# Patient Record
Sex: MALE | Race: WHITE | Employment: UNEMPLOYED | URBAN - METROPOLITAN AREA
[De-identification: names, ages, dates, MRNs, and addresses within clinical notes are randomized per-mention and may not be internally consistent; named-entity substitution may affect disease eponyms.]

---

## 2019-10-15 ENCOUNTER — TELEPHONE (OUTPATIENT)
Dept: FAMILY MEDICINE CLINIC | Age: 54
End: 2019-10-15

## 2019-10-21 ENCOUNTER — HOSPITAL ENCOUNTER (EMERGENCY)
Age: 54
Discharge: HOME | End: 2019-10-21
Attending: EMERGENCY MEDICINE
Payer: COMMERCIAL

## 2019-10-21 ENCOUNTER — APPOINTMENT (OUTPATIENT)
Dept: CT IMAGING | Age: 54
End: 2019-10-21
Payer: COMMERCIAL

## 2019-10-21 VITALS
SYSTOLIC BLOOD PRESSURE: 160 MMHG | TEMPERATURE: 97.6 F | RESPIRATION RATE: 14 BRPM | OXYGEN SATURATION: 99 % | HEART RATE: 49 BPM | DIASTOLIC BLOOD PRESSURE: 93 MMHG

## 2019-10-21 DIAGNOSIS — K29.00 OTHER ACUTE GASTRITIS WITHOUT HEMORRHAGE: ICD-10-CM

## 2019-10-21 DIAGNOSIS — R10.13 EPIGASTRIC PAIN: Primary | ICD-10-CM

## 2019-10-21 LAB
ALBUMIN SERPL-MCNC: 4.5 G/DL (ref 3.4–5)
ALP BLD-CCNC: 116 U/L (ref 40–129)
ALT SERPL-CCNC: 42 U/L (ref 10–40)
ANION GAP SERPL CALCULATED.3IONS-SCNC: 14 MMOL/L (ref 3–16)
AST SERPL-CCNC: 32 U/L (ref 15–37)
BASOPHILS ABSOLUTE: 0.1 K/UL (ref 0–0.2)
BASOPHILS RELATIVE PERCENT: 1 %
BILIRUB SERPL-MCNC: 0.6 MG/DL (ref 0–1)
BILIRUBIN DIRECT: <0.2 MG/DL (ref 0–0.3)
BILIRUBIN, INDIRECT: ABNORMAL MG/DL (ref 0–1)
BUN BLDV-MCNC: 15 MG/DL (ref 7–20)
CALCIUM SERPL-MCNC: 9.9 MG/DL (ref 8.3–10.6)
CHLORIDE BLD-SCNC: 100 MMOL/L (ref 99–110)
CO2: 22 MMOL/L (ref 21–32)
CREAT SERPL-MCNC: 1 MG/DL (ref 0.9–1.3)
EKG ATRIAL RATE: 84 BPM
EKG DIAGNOSIS: NORMAL
EKG P-R INTERVAL: 158 MS
EKG Q-T INTERVAL: 344 MS
EKG QRS DURATION: 88 MS
EKG QTC CALCULATION (BAZETT): 406 MS
EKG R AXIS: 120 DEGREES
EKG T AXIS: 94 DEGREES
EKG VENTRICULAR RATE: 84 BPM
EOSINOPHILS ABSOLUTE: 1 K/UL (ref 0–0.6)
EOSINOPHILS RELATIVE PERCENT: 8.1 %
GFR AFRICAN AMERICAN: >60
GFR NON-AFRICAN AMERICAN: >60
GLUCOSE BLD-MCNC: 119 MG/DL (ref 70–99)
HCT VFR BLD CALC: 48.7 % (ref 40.5–52.5)
HEMOGLOBIN: 16.5 G/DL (ref 13.5–17.5)
LIPASE: 43 U/L (ref 13–60)
LYMPHOCYTES ABSOLUTE: 2 K/UL (ref 1–5.1)
LYMPHOCYTES RELATIVE PERCENT: 17.1 %
MCH RBC QN AUTO: 29.3 PG (ref 26–34)
MCHC RBC AUTO-ENTMCNC: 33.9 G/DL (ref 31–36)
MCV RBC AUTO: 86.3 FL (ref 80–100)
MONOCYTES ABSOLUTE: 1 K/UL (ref 0–1.3)
MONOCYTES RELATIVE PERCENT: 8.1 %
NEUTROPHILS ABSOLUTE: 7.8 K/UL (ref 1.7–7.7)
NEUTROPHILS RELATIVE PERCENT: 65.7 %
PDW BLD-RTO: 14.3 % (ref 12.4–15.4)
PLATELET # BLD: 284 K/UL (ref 135–450)
PMV BLD AUTO: 7.9 FL (ref 5–10.5)
POTASSIUM SERPL-SCNC: 3.8 MMOL/L (ref 3.5–5.1)
RBC # BLD: 5.64 M/UL (ref 4.2–5.9)
SODIUM BLD-SCNC: 136 MMOL/L (ref 136–145)
TOTAL PROTEIN: 7.9 G/DL (ref 6.4–8.2)
TROPONIN: <0.01 NG/ML
WBC # BLD: 11.8 K/UL (ref 4–11)

## 2019-10-21 PROCEDURE — 96374 THER/PROPH/DIAG INJ IV PUSH: CPT

## 2019-10-21 PROCEDURE — 80076 HEPATIC FUNCTION PANEL: CPT

## 2019-10-21 PROCEDURE — 96372 THER/PROPH/DIAG INJ SC/IM: CPT

## 2019-10-21 PROCEDURE — 6360000002 HC RX W HCPCS: Performed by: EMERGENCY MEDICINE

## 2019-10-21 PROCEDURE — 74177 CT ABD & PELVIS W/CONTRAST: CPT

## 2019-10-21 PROCEDURE — 99284 EMERGENCY DEPT VISIT MOD MDM: CPT

## 2019-10-21 PROCEDURE — 36415 COLL VENOUS BLD VENIPUNCTURE: CPT

## 2019-10-21 PROCEDURE — 85025 COMPLETE CBC W/AUTO DIFF WBC: CPT

## 2019-10-21 PROCEDURE — 84484 ASSAY OF TROPONIN QUANT: CPT

## 2019-10-21 PROCEDURE — 83690 ASSAY OF LIPASE: CPT

## 2019-10-21 PROCEDURE — 93005 ELECTROCARDIOGRAM TRACING: CPT | Performed by: EMERGENCY MEDICINE

## 2019-10-21 PROCEDURE — C9113 INJ PANTOPRAZOLE SODIUM, VIA: HCPCS | Performed by: EMERGENCY MEDICINE

## 2019-10-21 PROCEDURE — 80048 BASIC METABOLIC PNL TOTAL CA: CPT

## 2019-10-21 PROCEDURE — 6370000000 HC RX 637 (ALT 250 FOR IP): Performed by: EMERGENCY MEDICINE

## 2019-10-21 PROCEDURE — 6360000004 HC RX CONTRAST MEDICATION: Performed by: EMERGENCY MEDICINE

## 2019-10-21 PROCEDURE — 96375 TX/PRO/DX INJ NEW DRUG ADDON: CPT

## 2019-10-21 RX ORDER — DICYCLOMINE HYDROCHLORIDE 10 MG/1
10 CAPSULE ORAL 3 TIMES DAILY PRN
Qty: 120 CAPSULE | Refills: 3 | Status: SHIPPED | OUTPATIENT
Start: 2019-10-21 | End: 2021-12-27

## 2019-10-21 RX ORDER — ONDANSETRON 2 MG/ML
4 INJECTION INTRAMUSCULAR; INTRAVENOUS ONCE
Status: COMPLETED | OUTPATIENT
Start: 2019-10-21 | End: 2019-10-21

## 2019-10-21 RX ORDER — SUCRALFATE ORAL 1 G/10ML
1 SUSPENSION ORAL ONCE
Status: COMPLETED | OUTPATIENT
Start: 2019-10-21 | End: 2019-10-21

## 2019-10-21 RX ORDER — ASPIRIN 325 MG
325 TABLET ORAL DAILY
Status: ON HOLD | COMMUNITY
End: 2019-10-29 | Stop reason: HOSPADM

## 2019-10-21 RX ORDER — OMEPRAZOLE 10 MG/1
10 CAPSULE, DELAYED RELEASE ORAL DAILY
COMMUNITY
End: 2019-10-21

## 2019-10-21 RX ORDER — METOCLOPRAMIDE HYDROCHLORIDE 5 MG/ML
10 INJECTION INTRAMUSCULAR; INTRAVENOUS ONCE
Status: COMPLETED | OUTPATIENT
Start: 2019-10-21 | End: 2019-10-21

## 2019-10-21 RX ORDER — PANTOPRAZOLE SODIUM 40 MG/10ML
40 INJECTION, POWDER, LYOPHILIZED, FOR SOLUTION INTRAVENOUS ONCE
Status: COMPLETED | OUTPATIENT
Start: 2019-10-21 | End: 2019-10-21

## 2019-10-21 RX ORDER — DICYCLOMINE HYDROCHLORIDE 10 MG/ML
20 INJECTION INTRAMUSCULAR ONCE
Status: COMPLETED | OUTPATIENT
Start: 2019-10-21 | End: 2019-10-21

## 2019-10-21 RX ORDER — ATORVASTATIN CALCIUM 10 MG/1
10 TABLET, FILM COATED ORAL DAILY
COMMUNITY
End: 2019-10-24

## 2019-10-21 RX ORDER — OMEPRAZOLE 20 MG/1
20 CAPSULE, DELAYED RELEASE ORAL
Qty: 60 CAPSULE | Refills: 0 | Status: SHIPPED | OUTPATIENT
Start: 2019-10-21 | End: 2019-11-26 | Stop reason: SDUPTHER

## 2019-10-21 RX ADMIN — SUCRALFATE 1 G: 1 SUSPENSION ORAL at 04:39

## 2019-10-21 RX ADMIN — HYDROMORPHONE HYDROCHLORIDE 1 MG: 1 INJECTION, SOLUTION INTRAMUSCULAR; INTRAVENOUS; SUBCUTANEOUS at 02:32

## 2019-10-21 RX ADMIN — IOPAMIDOL 80 ML: 755 INJECTION, SOLUTION INTRAVENOUS at 03:45

## 2019-10-21 RX ADMIN — LIDOCAINE HYDROCHLORIDE: 20 SOLUTION ORAL; TOPICAL at 03:02

## 2019-10-21 RX ADMIN — ONDANSETRON 4 MG: 2 INJECTION INTRAMUSCULAR; INTRAVENOUS at 02:32

## 2019-10-21 RX ADMIN — METOCLOPRAMIDE 10 MG: 5 INJECTION, SOLUTION INTRAMUSCULAR; INTRAVENOUS at 02:32

## 2019-10-21 RX ADMIN — DICYCLOMINE HYDROCHLORIDE 20 MG: 20 INJECTION, SOLUTION INTRAMUSCULAR at 05:06

## 2019-10-21 RX ADMIN — PANTOPRAZOLE SODIUM 40 MG: 40 INJECTION, POWDER, FOR SOLUTION INTRAVENOUS at 02:32

## 2019-10-21 ASSESSMENT — PAIN DESCRIPTION - LOCATION
LOCATION: ABDOMEN;CHEST
LOCATION: ABDOMEN;BACK

## 2019-10-21 ASSESSMENT — PAIN DESCRIPTION - FREQUENCY: FREQUENCY: INTERMITTENT

## 2019-10-21 ASSESSMENT — PAIN DESCRIPTION - PROGRESSION: CLINICAL_PROGRESSION: NOT CHANGED

## 2019-10-21 ASSESSMENT — PAIN SCALES - GENERAL
PAINLEVEL_OUTOF10: 10
PAINLEVEL_OUTOF10: 9

## 2019-10-21 ASSESSMENT — PAIN DESCRIPTION - DESCRIPTORS: DESCRIPTORS: SHARP;ACHING

## 2019-10-21 ASSESSMENT — PAIN DESCRIPTION - PAIN TYPE
TYPE: ACUTE PAIN
TYPE: ACUTE PAIN

## 2019-10-24 ENCOUNTER — OFFICE VISIT (OUTPATIENT)
Dept: INTERNAL MEDICINE CLINIC | Age: 54
End: 2019-10-24
Payer: COMMERCIAL

## 2019-10-24 VITALS
OXYGEN SATURATION: 98 % | SYSTOLIC BLOOD PRESSURE: 123 MMHG | WEIGHT: 198.9 LBS | TEMPERATURE: 97.3 F | RESPIRATION RATE: 16 BRPM | HEART RATE: 92 BPM | DIASTOLIC BLOOD PRESSURE: 85 MMHG

## 2019-10-24 DIAGNOSIS — E78.2 MIXED HYPERLIPIDEMIA: ICD-10-CM

## 2019-10-24 DIAGNOSIS — R10.11 RUQ ABDOMINAL PAIN: ICD-10-CM

## 2019-10-24 DIAGNOSIS — Z72.0 TOBACCO ABUSE: ICD-10-CM

## 2019-10-24 DIAGNOSIS — R73.9 HYPERGLYCEMIA: Primary | ICD-10-CM

## 2019-10-24 DIAGNOSIS — K21.9 GASTROESOPHAGEAL REFLUX DISEASE, ESOPHAGITIS PRESENCE NOT SPECIFIED: ICD-10-CM

## 2019-10-24 PROCEDURE — 99213 OFFICE O/P EST LOW 20 MIN: CPT

## 2019-10-24 RX ORDER — BUPROPION HYDROCHLORIDE 150 MG/1
150 TABLET, EXTENDED RELEASE ORAL 2 TIMES DAILY
Qty: 60 TABLET | Refills: 3 | Status: SHIPPED | OUTPATIENT
Start: 2019-10-24 | End: 2019-11-26 | Stop reason: SDUPTHER

## 2019-10-24 RX ORDER — ATORVASTATIN CALCIUM 80 MG/1
80 TABLET, FILM COATED ORAL DAILY
Qty: 30 TABLET | Refills: 5 | Status: SHIPPED | OUTPATIENT
Start: 2019-10-24 | End: 2019-11-26 | Stop reason: SDUPTHER

## 2019-10-24 ASSESSMENT — ENCOUNTER SYMPTOMS
VOMITING: 0
ABDOMINAL DISTENTION: 0
EYES NEGATIVE: 1
DIARRHEA: 1
RECTAL PAIN: 0
BLOOD IN STOOL: 0
ANAL BLEEDING: 0
ABDOMINAL PAIN: 1
NAUSEA: 0
CONSTIPATION: 0
ALLERGIC/IMMUNOLOGIC NEGATIVE: 1
RESPIRATORY NEGATIVE: 1

## 2019-10-24 ASSESSMENT — PATIENT HEALTH QUESTIONNAIRE - PHQ9
1. LITTLE INTEREST OR PLEASURE IN DOING THINGS: 0
SUM OF ALL RESPONSES TO PHQ QUESTIONS 1-9: 0
SUM OF ALL RESPONSES TO PHQ9 QUESTIONS 1 & 2: 0
SUM OF ALL RESPONSES TO PHQ QUESTIONS 1-9: 0
2. FEELING DOWN, DEPRESSED OR HOPELESS: 0

## 2019-10-25 DIAGNOSIS — K21.9 GASTROESOPHAGEAL REFLUX DISEASE, ESOPHAGITIS PRESENCE NOT SPECIFIED: ICD-10-CM

## 2019-10-25 DIAGNOSIS — R73.9 HYPERGLYCEMIA: ICD-10-CM

## 2019-10-25 LAB — TSH REFLEX: 1.46 UIU/ML (ref 0.27–4.2)

## 2019-10-26 LAB
ESTIMATED AVERAGE GLUCOSE: 105.4 MG/DL
HBA1C MFR BLD: 5.3 %

## 2019-10-28 ENCOUNTER — HOSPITAL ENCOUNTER (OUTPATIENT)
Dept: ULTRASOUND IMAGING | Age: 54
Discharge: HOME | End: 2019-10-28
Payer: COMMERCIAL

## 2019-10-28 DIAGNOSIS — R10.11 RUQ ABDOMINAL PAIN: ICD-10-CM

## 2019-10-28 PROCEDURE — 76705 ECHO EXAM OF ABDOMEN: CPT

## 2019-10-29 ENCOUNTER — HOSPITAL ENCOUNTER (OUTPATIENT)
Age: 54
Setting detail: OUTPATIENT SURGERY
Discharge: HOME | End: 2019-10-29
Attending: INTERNAL MEDICINE | Admitting: INTERNAL MEDICINE
Payer: COMMERCIAL

## 2019-10-29 VITALS
SYSTOLIC BLOOD PRESSURE: 98 MMHG | HEART RATE: 81 BPM | HEIGHT: 70 IN | DIASTOLIC BLOOD PRESSURE: 72 MMHG | WEIGHT: 196 LBS | OXYGEN SATURATION: 94 % | RESPIRATION RATE: 16 BRPM | TEMPERATURE: 97.5 F | BODY MASS INDEX: 28.06 KG/M2

## 2019-10-29 LAB
ALBUMIN SERPL-MCNC: 4.1 G/DL (ref 3.4–5)
ALP BLD-CCNC: 99 U/L (ref 40–129)
ALT SERPL-CCNC: 33 U/L (ref 10–40)
AST SERPL-CCNC: 35 U/L (ref 15–37)
BILIRUB SERPL-MCNC: 0.9 MG/DL (ref 0–1)
BILIRUBIN DIRECT: <0.2 MG/DL (ref 0–0.3)
BILIRUBIN, INDIRECT: NORMAL MG/DL (ref 0–1)
TOTAL PROTEIN: 7.2 G/DL (ref 6.4–8.2)

## 2019-10-29 PROCEDURE — 6370000000 HC RX 637 (ALT 250 FOR IP): Performed by: INTERNAL MEDICINE

## 2019-10-29 PROCEDURE — 36415 COLL VENOUS BLD VENIPUNCTURE: CPT

## 2019-10-29 PROCEDURE — 7100000010 HC PHASE II RECOVERY - FIRST 15 MIN: Performed by: INTERNAL MEDICINE

## 2019-10-29 PROCEDURE — 3609017100 HC EGD: Performed by: INTERNAL MEDICINE

## 2019-10-29 PROCEDURE — 80076 HEPATIC FUNCTION PANEL: CPT

## 2019-10-29 PROCEDURE — 7100000011 HC PHASE II RECOVERY - ADDTL 15 MIN: Performed by: INTERNAL MEDICINE

## 2019-10-29 PROCEDURE — 99152 MOD SED SAME PHYS/QHP 5/>YRS: CPT | Performed by: INTERNAL MEDICINE

## 2019-10-29 PROCEDURE — 6360000002 HC RX W HCPCS: Performed by: INTERNAL MEDICINE

## 2019-10-29 RX ORDER — MEPERIDINE HYDROCHLORIDE 50 MG/ML
INJECTION INTRAMUSCULAR; INTRAVENOUS; SUBCUTANEOUS PRN
Status: DISCONTINUED | OUTPATIENT
Start: 2019-10-29 | End: 2019-10-29 | Stop reason: ALTCHOICE

## 2019-10-29 RX ORDER — MIDAZOLAM HYDROCHLORIDE 1 MG/ML
INJECTION INTRAMUSCULAR; INTRAVENOUS PRN
Status: DISCONTINUED | OUTPATIENT
Start: 2019-10-29 | End: 2019-10-29 | Stop reason: ALTCHOICE

## 2019-10-29 ASSESSMENT — PAIN - FUNCTIONAL ASSESSMENT
PAIN_FUNCTIONAL_ASSESSMENT: PREVENTS OR INTERFERES SOME ACTIVE ACTIVITIES AND ADLS
PAIN_FUNCTIONAL_ASSESSMENT: 0-10

## 2019-10-29 ASSESSMENT — PAIN SCALES - GENERAL
PAINLEVEL_OUTOF10: 0
PAINLEVEL_OUTOF10: 0

## 2019-10-29 ASSESSMENT — PAIN DESCRIPTION - DESCRIPTORS: DESCRIPTORS: CRAMPING

## 2019-10-31 ENCOUNTER — HOSPITAL ENCOUNTER (OUTPATIENT)
Age: 54
Setting detail: OUTPATIENT SURGERY
Discharge: HOME | End: 2019-10-31
Attending: INTERNAL MEDICINE | Admitting: INTERNAL MEDICINE
Payer: COMMERCIAL

## 2019-10-31 VITALS
BODY MASS INDEX: 28.06 KG/M2 | SYSTOLIC BLOOD PRESSURE: 101 MMHG | HEART RATE: 71 BPM | RESPIRATION RATE: 15 BRPM | HEIGHT: 70 IN | WEIGHT: 196 LBS | DIASTOLIC BLOOD PRESSURE: 72 MMHG | TEMPERATURE: 97.6 F | OXYGEN SATURATION: 93 %

## 2019-10-31 PROCEDURE — 3609027000 HC COLONOSCOPY: Performed by: INTERNAL MEDICINE

## 2019-10-31 PROCEDURE — 7100000010 HC PHASE II RECOVERY - FIRST 15 MIN: Performed by: INTERNAL MEDICINE

## 2019-10-31 PROCEDURE — 2500000003 HC RX 250 WO HCPCS: Performed by: INTERNAL MEDICINE

## 2019-10-31 PROCEDURE — 6360000002 HC RX W HCPCS: Performed by: INTERNAL MEDICINE

## 2019-10-31 PROCEDURE — 99152 MOD SED SAME PHYS/QHP 5/>YRS: CPT | Performed by: INTERNAL MEDICINE

## 2019-10-31 PROCEDURE — 7100000011 HC PHASE II RECOVERY - ADDTL 15 MIN: Performed by: INTERNAL MEDICINE

## 2019-10-31 RX ORDER — MEPERIDINE HYDROCHLORIDE 50 MG/ML
INJECTION INTRAMUSCULAR; INTRAVENOUS; SUBCUTANEOUS PRN
Status: DISCONTINUED | OUTPATIENT
Start: 2019-10-31 | End: 2019-10-31 | Stop reason: ALTCHOICE

## 2019-10-31 RX ORDER — MIDAZOLAM HYDROCHLORIDE 1 MG/ML
INJECTION INTRAMUSCULAR; INTRAVENOUS PRN
Status: DISCONTINUED | OUTPATIENT
Start: 2019-10-31 | End: 2019-10-31 | Stop reason: ALTCHOICE

## 2019-10-31 RX ORDER — ASPIRIN 325 MG
325 TABLET ORAL DAILY
COMMUNITY
End: 2021-12-27

## 2019-10-31 ASSESSMENT — PAIN SCALES - WONG BAKER
WONGBAKER_NUMERICALRESPONSE: 0
WONGBAKER_NUMERICALRESPONSE: 2
WONGBAKER_NUMERICALRESPONSE: 0

## 2019-10-31 ASSESSMENT — PAIN - FUNCTIONAL ASSESSMENT: PAIN_FUNCTIONAL_ASSESSMENT: 0-10

## 2019-11-26 ENCOUNTER — OFFICE VISIT (OUTPATIENT)
Dept: INTERNAL MEDICINE CLINIC | Age: 54
End: 2019-11-26
Payer: COMMERCIAL

## 2019-11-26 VITALS
WEIGHT: 193.5 LBS | BODY MASS INDEX: 27.7 KG/M2 | TEMPERATURE: 97.9 F | RESPIRATION RATE: 16 BRPM | SYSTOLIC BLOOD PRESSURE: 117 MMHG | OXYGEN SATURATION: 96 % | DIASTOLIC BLOOD PRESSURE: 87 MMHG | HEART RATE: 89 BPM | HEIGHT: 70 IN

## 2019-11-26 DIAGNOSIS — M25.551 PAIN OF RIGHT HIP JOINT: ICD-10-CM

## 2019-11-26 DIAGNOSIS — Z86.73 HX OF TRANSIENT ISCHEMIC ATTACK (TIA): ICD-10-CM

## 2019-11-26 DIAGNOSIS — Z72.0 TOBACCO ABUSE: ICD-10-CM

## 2019-11-26 DIAGNOSIS — K21.9 GASTROESOPHAGEAL REFLUX DISEASE, ESOPHAGITIS PRESENCE NOT SPECIFIED: Primary | ICD-10-CM

## 2019-11-26 DIAGNOSIS — E78.2 MIXED HYPERLIPIDEMIA: ICD-10-CM

## 2019-11-26 PROCEDURE — 99213 OFFICE O/P EST LOW 20 MIN: CPT | Performed by: STUDENT IN AN ORGANIZED HEALTH CARE EDUCATION/TRAINING PROGRAM

## 2019-11-26 RX ORDER — OMEPRAZOLE 20 MG/1
20 CAPSULE, DELAYED RELEASE ORAL
Qty: 60 CAPSULE | Refills: 0 | Status: SHIPPED | OUTPATIENT
Start: 2019-11-26 | End: 2020-01-07 | Stop reason: SDUPTHER

## 2019-11-26 RX ORDER — ATORVASTATIN CALCIUM 80 MG/1
80 TABLET, FILM COATED ORAL DAILY
Qty: 30 TABLET | Refills: 5 | Status: SHIPPED | OUTPATIENT
Start: 2019-11-26 | End: 2020-06-29 | Stop reason: SDUPTHER

## 2019-11-26 RX ORDER — BUPROPION HYDROCHLORIDE 150 MG/1
150 TABLET, EXTENDED RELEASE ORAL 2 TIMES DAILY
Qty: 60 TABLET | Refills: 3 | Status: SHIPPED | OUTPATIENT
Start: 2019-11-26 | End: 2020-04-24

## 2019-11-26 ASSESSMENT — ENCOUNTER SYMPTOMS
CONSTIPATION: 0
SORE THROAT: 0
COUGH: 0
DIARRHEA: 0
ABDOMINAL PAIN: 0
SHORTNESS OF BREATH: 0
NAUSEA: 0
BACK PAIN: 1
WHEEZING: 0
VOMITING: 0
RHINORRHEA: 0
ABDOMINAL DISTENTION: 0

## 2019-12-05 ENCOUNTER — HOSPITAL ENCOUNTER (OUTPATIENT)
Dept: GENERAL RADIOLOGY | Age: 54
Discharge: HOME | End: 2019-12-05

## 2019-12-05 ENCOUNTER — HOSPITAL ENCOUNTER (OUTPATIENT)
Age: 54
Discharge: HOME | End: 2019-12-05

## 2019-12-05 DIAGNOSIS — M25.551 PAIN OF RIGHT HIP JOINT: ICD-10-CM

## 2019-12-05 LAB
BASOPHILS ABSOLUTE: 0.1 K/UL (ref 0–0.2)
BASOPHILS RELATIVE PERCENT: 1.3 %
EOSINOPHILS ABSOLUTE: 0.3 K/UL (ref 0–0.6)
EOSINOPHILS RELATIVE PERCENT: 4.8 %
HCT VFR BLD CALC: 43.6 % (ref 40.5–52.5)
HEMOGLOBIN: 14.7 G/DL (ref 13.5–17.5)
LYMPHOCYTES ABSOLUTE: 1.4 K/UL (ref 1–5.1)
LYMPHOCYTES RELATIVE PERCENT: 20.1 %
MCH RBC QN AUTO: 29.4 PG (ref 26–34)
MCHC RBC AUTO-ENTMCNC: 33.8 G/DL (ref 31–36)
MCV RBC AUTO: 87.1 FL (ref 80–100)
MONOCYTES ABSOLUTE: 0.6 K/UL (ref 0–1.3)
MONOCYTES RELATIVE PERCENT: 9.1 %
NEUTROPHILS ABSOLUTE: 4.4 K/UL (ref 1.7–7.7)
NEUTROPHILS RELATIVE PERCENT: 64.7 %
PDW BLD-RTO: 14.9 % (ref 12.4–15.4)
PLATELET # BLD: 249 K/UL (ref 135–450)
PMV BLD AUTO: 8.6 FL (ref 5–10.5)
RBC # BLD: 5 M/UL (ref 4.2–5.9)
WBC # BLD: 6.8 K/UL (ref 4–11)

## 2019-12-05 PROCEDURE — 73502 X-RAY EXAM HIP UNI 2-3 VIEWS: CPT

## 2020-01-07 RX ORDER — OMEPRAZOLE 20 MG/1
20 CAPSULE, DELAYED RELEASE ORAL
Qty: 60 CAPSULE | Refills: 0 | Status: SHIPPED | OUTPATIENT
Start: 2020-01-07 | End: 2020-03-25

## 2020-01-07 RX ORDER — OMEPRAZOLE 20 MG/1
CAPSULE, DELAYED RELEASE ORAL
Qty: 60 CAPSULE | Refills: 0 | Status: SHIPPED | OUTPATIENT
Start: 2020-01-07 | End: 2020-03-23

## 2020-03-08 ENCOUNTER — APPOINTMENT (OUTPATIENT)
Dept: CT IMAGING | Age: 55
End: 2020-03-08

## 2020-03-08 ENCOUNTER — HOSPITAL ENCOUNTER (EMERGENCY)
Age: 55
Discharge: HOME | End: 2020-03-08
Attending: EMERGENCY MEDICINE

## 2020-03-08 VITALS
HEART RATE: 70 BPM | BODY MASS INDEX: 27.05 KG/M2 | TEMPERATURE: 97.4 F | RESPIRATION RATE: 15 BRPM | WEIGHT: 188.5 LBS | OXYGEN SATURATION: 99 % | DIASTOLIC BLOOD PRESSURE: 93 MMHG | SYSTOLIC BLOOD PRESSURE: 140 MMHG

## 2020-03-08 LAB
A/G RATIO: 1.4 (ref 1.1–2.2)
ALBUMIN SERPL-MCNC: 4.2 G/DL (ref 3.4–5)
ALP BLD-CCNC: 120 U/L (ref 40–129)
ALT SERPL-CCNC: 21 U/L (ref 10–40)
ANION GAP SERPL CALCULATED.3IONS-SCNC: 13 MMOL/L (ref 3–16)
AST SERPL-CCNC: 21 U/L (ref 15–37)
BILIRUB SERPL-MCNC: 0.4 MG/DL (ref 0–1)
BUN BLDV-MCNC: 17 MG/DL (ref 7–20)
CALCIUM SERPL-MCNC: 9.3 MG/DL (ref 8.3–10.6)
CHLORIDE BLD-SCNC: 104 MMOL/L (ref 99–110)
CO2: 25 MMOL/L (ref 21–32)
CREAT SERPL-MCNC: 0.9 MG/DL (ref 0.9–1.3)
GFR AFRICAN AMERICAN: >60
GFR NON-AFRICAN AMERICAN: >60
GLOBULIN: 2.9 G/DL
GLUCOSE BLD-MCNC: 106 MG/DL (ref 70–99)
HCT VFR BLD CALC: 49.1 % (ref 40.5–52.5)
HEMOGLOBIN: 16.6 G/DL (ref 13.5–17.5)
LIPASE: 35 U/L (ref 13–60)
MCH RBC QN AUTO: 29.5 PG (ref 26–34)
MCHC RBC AUTO-ENTMCNC: 33.8 G/DL (ref 31–36)
MCV RBC AUTO: 87.1 FL (ref 80–100)
PDW BLD-RTO: 14.5 % (ref 12.4–15.4)
PLATELET # BLD: 263 K/UL (ref 135–450)
PMV BLD AUTO: 7.8 FL (ref 5–10.5)
POTASSIUM REFLEX MAGNESIUM: 4.8 MMOL/L (ref 3.5–5.1)
RBC # BLD: 5.63 M/UL (ref 4.2–5.9)
SODIUM BLD-SCNC: 142 MMOL/L (ref 136–145)
TOTAL PROTEIN: 7.1 G/DL (ref 6.4–8.2)
WBC # BLD: 8.8 K/UL (ref 4–11)

## 2020-03-08 PROCEDURE — 6370000000 HC RX 637 (ALT 250 FOR IP): Performed by: EMERGENCY MEDICINE

## 2020-03-08 PROCEDURE — 6360000002 HC RX W HCPCS: Performed by: EMERGENCY MEDICINE

## 2020-03-08 PROCEDURE — 83690 ASSAY OF LIPASE: CPT

## 2020-03-08 PROCEDURE — 6360000004 HC RX CONTRAST MEDICATION: Performed by: EMERGENCY MEDICINE

## 2020-03-08 PROCEDURE — 74177 CT ABD & PELVIS W/CONTRAST: CPT

## 2020-03-08 PROCEDURE — 85027 COMPLETE CBC AUTOMATED: CPT

## 2020-03-08 PROCEDURE — 99284 EMERGENCY DEPT VISIT MOD MDM: CPT

## 2020-03-08 PROCEDURE — 80053 COMPREHEN METABOLIC PANEL: CPT

## 2020-03-08 PROCEDURE — 2500000003 HC RX 250 WO HCPCS: Performed by: EMERGENCY MEDICINE

## 2020-03-08 RX ORDER — DICYCLOMINE HYDROCHLORIDE 10 MG/1
10 CAPSULE ORAL
Status: DISCONTINUED | OUTPATIENT
Start: 2020-03-08 | End: 2020-03-08 | Stop reason: HOSPADM

## 2020-03-08 RX ORDER — ONDANSETRON 2 MG/ML
4 INJECTION INTRAMUSCULAR; INTRAVENOUS ONCE
Status: COMPLETED | OUTPATIENT
Start: 2020-03-08 | End: 2020-03-08

## 2020-03-08 RX ADMIN — LIDOCAINE HYDROCHLORIDE: 20 SOLUTION ORAL; TOPICAL at 14:38

## 2020-03-08 RX ADMIN — HYDROMORPHONE HYDROCHLORIDE 1 MG: 1 INJECTION, SOLUTION INTRAMUSCULAR; INTRAVENOUS; SUBCUTANEOUS at 16:13

## 2020-03-08 RX ADMIN — IOHEXOL 50 ML: 240 INJECTION, SOLUTION INTRATHECAL; INTRAVASCULAR; INTRAVENOUS; ORAL at 17:28

## 2020-03-08 RX ADMIN — DICYCLOMINE HYDROCHLORIDE 10 MG: 10 CAPSULE ORAL at 16:10

## 2020-03-08 RX ADMIN — ONDANSETRON 4 MG: 2 INJECTION INTRAMUSCULAR; INTRAVENOUS at 16:11

## 2020-03-08 RX ADMIN — FAMOTIDINE 20 MG: 10 INJECTION, SOLUTION INTRAVENOUS at 16:01

## 2020-03-08 RX ADMIN — IOPAMIDOL 80 ML: 755 INJECTION, SOLUTION INTRAVENOUS at 17:31

## 2020-03-08 ASSESSMENT — PAIN SCALES - GENERAL: PAINLEVEL_OUTOF10: 6

## 2020-03-08 ASSESSMENT — PAIN DESCRIPTION - ORIENTATION: ORIENTATION: UPPER

## 2020-03-08 ASSESSMENT — PAIN DESCRIPTION - LOCATION: LOCATION: ABDOMEN

## 2020-03-08 ASSESSMENT — PAIN DESCRIPTION - PAIN TYPE: TYPE: ACUTE PAIN

## 2020-03-08 ASSESSMENT — PAIN DESCRIPTION - DESCRIPTORS: DESCRIPTORS: ACHING

## 2020-03-08 NOTE — ED PROVIDER NOTES
Never    Sexual activity: Not on file   Lifestyle    Physical activity     Days per week: Not on file     Minutes per session: Not on file    Stress: Not on file   Relationships    Social connections     Talks on phone: Not on file     Gets together: Not on file     Attends Zoroastrian service: Not on file     Active member of club or organization: Not on file     Attends meetings of clubs or organizations: Not on file     Relationship status: Not on file    Intimate partner violence     Fear of current or ex partner: Not on file     Emotionally abused: Not on file     Physically abused: Not on file     Forced sexual activity: Not on file   Other Topics Concern    Not on file   Social History Narrative    Not on file     No current facility-administered medications for this encounter. Current Outpatient Medications   Medication Sig Dispense Refill    oxyCODONE HCl 5 MG TABA Take 5 mg by mouth 4 times daily for 3 days. 6 each 0    omeprazole (PRILOSEC) 20 MG delayed release capsule TAKE 1 CAPSULE BY MOUTH 2 TIMES A DAY BEFORE MEALS 60 capsule 0    omeprazole (PRILOSEC) 20 MG delayed release capsule Take 1 capsule by mouth 2 times daily (before meals) 60 capsule 0    atorvastatin (LIPITOR) 80 MG tablet Take 1 tablet by mouth daily 30 tablet 5    buPROPion (ZYBAN) 150 MG extended release tablet Take 1 tablet by mouth 2 times daily 60 tablet 3    diclofenac sodium 1 % GEL Apply 4 g topically 4 times daily 4 Tube 1    aspirin 325 MG tablet Take 325 mg by mouth daily      dicyclomine (BENTYL) 10 MG capsule Take 1 capsule by mouth 3 times daily as needed (abdominal pain) 120 capsule 3     Allergies   Allergen Reactions    Pcn [Penicillins] Hives     childhood          PHYSICAL EXAM  BP (!) 140/93   Pulse 70   Temp 97.4 °F (36.3 °C) (Oral)   Resp 15   Wt 85.5 kg (188 lb 8 oz)   SpO2 99%   BMI 27.05 kg/m²   Physical Exam   GENERAL APPEARANCE: Awake and alert. Cooperative. In no acute distress.   EYES: PERRL. Corneas clear. Sclera non icteric. No conjunctival injection  ENT: Oropharynx clear. Mucous membranes are moist airway patent. No stridor. No asymmetry. NECK: Supple  LUNGS: Clear. Equal breath sounds bilaterally. CARDIOVASCULAR: RRR. No murmurs rubs or gallops. ABDOMEN: Soft moderate epigastric tenderness no guarding or rebound. No mass or bruit  EXTREMITIES:  Moves all extremities equally. Full pulses are equal  SKIN: Warm and dry. NEURO: Alert and oriented x3. Strength 5/5 throughout. LABORATORY STUDIES:   Labs Reviewed   COMPREHENSIVE METABOLIC PANEL W/ REFLEX TO MG FOR LOW K - Abnormal; Notable for the following components:       Result Value    Glucose 106 (*)     All other components within normal limits    Narrative:     Performed at:                  [facility]                  [address],  [facility]   Phone [phone]   CBC    Narrative:     Performed at:                  [facility]                  [address],  [facility]   Phone [phone]   LIPASE    Narrative:     Performed at:                  [facility]                  [address],  [facility]   Phone [phone]        RADIOLOGY  CT ABDOMEN PELVIS W IV CONTRAST Additional Contrast? Oral   Final Result      1. No acute abnormality in the abdomen and pelvis. If EKG done, EKG was interpreted independently by me    PROCEDURES  Procedures    EDCOURSE/MDM  Patient seen and evaluated. Old records selectively reviewed if pertinent. Labs and imaging reviewed and results discussed with patient. I considered gastritis, esophagitis, peptic ulcer disease, biliary colic, cholecystitis. Patient appeared much more comfortable just prior to discharge. No evidence at this time of etiology requiring urgent intervention.   Patient advised to continue with his proton pump inhibitor for possible duodenitis. If Jg Shaver is discharged, I discussed with Duane Wiltse and/or family the exam results, diagnosis, care, prognosis, reasons to return and the importance of follow up. Patient and/or family is in agreement with plan and all questions have been answered. Specific discharge instructions explained, including reasons to return to the emergency department. If discharged, patient was given scripts for the following medications. Discharge Medication List as of 3/8/2020  6:35 PM      START taking these medications    Details   oxyCODONE HCl 5 MG TABA Take 5 mg by mouth 4 times daily for 3 days. , Disp-6 each, R-0Print             CLINICAL IMPRESSION  1. Abdominal pain, unspecified abdominal location    2. Elevated blood pressure reading        BP (!) 140/93   Pulse 70   Temp 97.4 °F (36.3 °C) (Oral)   Resp 15   Wt 85.5 kg (188 lb 8 oz)   SpO2 99%   BMI 27.05 kg/m²     DISPOSITION  Duane Wiltse was discharged in stable condition.                    Larissa Yip MD  03/10/20 0432

## 2020-03-25 RX ORDER — OMEPRAZOLE 20 MG/1
CAPSULE, DELAYED RELEASE ORAL
Qty: 60 CAPSULE | Refills: 1 | Status: SHIPPED | OUTPATIENT
Start: 2020-03-25 | End: 2020-05-29

## 2020-04-24 ENCOUNTER — HOSPITAL ENCOUNTER (EMERGENCY)
Age: 55
Discharge: HOME | End: 2020-04-24
Attending: EMERGENCY MEDICINE

## 2020-04-24 VITALS
TEMPERATURE: 97.8 F | HEART RATE: 72 BPM | OXYGEN SATURATION: 100 % | DIASTOLIC BLOOD PRESSURE: 97 MMHG | SYSTOLIC BLOOD PRESSURE: 147 MMHG | RESPIRATION RATE: 20 BRPM

## 2020-04-24 LAB
ALBUMIN SERPL-MCNC: 4.4 G/DL (ref 3.4–5)
ALP BLD-CCNC: 115 U/L (ref 40–129)
ALT SERPL-CCNC: 39 U/L (ref 10–40)
ANION GAP SERPL CALCULATED.3IONS-SCNC: 12 MMOL/L (ref 3–16)
AST SERPL-CCNC: 30 U/L (ref 15–37)
BASOPHILS ABSOLUTE: 0.1 K/UL (ref 0–0.2)
BASOPHILS RELATIVE PERCENT: 1.4 %
BILIRUB SERPL-MCNC: 0.4 MG/DL (ref 0–1)
BILIRUBIN DIRECT: <0.2 MG/DL (ref 0–0.3)
BILIRUBIN, INDIRECT: NORMAL MG/DL (ref 0–1)
BUN BLDV-MCNC: 16 MG/DL (ref 7–20)
CALCIUM SERPL-MCNC: 9.8 MG/DL (ref 8.3–10.6)
CHLORIDE BLD-SCNC: 103 MMOL/L (ref 99–110)
CO2: 25 MMOL/L (ref 21–32)
CREAT SERPL-MCNC: 0.9 MG/DL (ref 0.9–1.3)
EKG ATRIAL RATE: 67 BPM
EKG DIAGNOSIS: NORMAL
EKG P AXIS: 52 DEGREES
EKG P-R INTERVAL: 146 MS
EKG Q-T INTERVAL: 374 MS
EKG QRS DURATION: 84 MS
EKG QTC CALCULATION (BAZETT): 395 MS
EKG R AXIS: 80 DEGREES
EKG T AXIS: 64 DEGREES
EKG VENTRICULAR RATE: 67 BPM
EOSINOPHILS ABSOLUTE: 0.6 K/UL (ref 0–0.6)
EOSINOPHILS RELATIVE PERCENT: 8.7 %
GFR AFRICAN AMERICAN: >60
GFR NON-AFRICAN AMERICAN: >60
GLUCOSE BLD-MCNC: 111 MG/DL (ref 70–99)
HCT VFR BLD CALC: 50.2 % (ref 40.5–52.5)
HEMOGLOBIN: 17 G/DL (ref 13.5–17.5)
LIPASE: 41 U/L (ref 13–60)
LYMPHOCYTES ABSOLUTE: 2.6 K/UL (ref 1–5.1)
LYMPHOCYTES RELATIVE PERCENT: 36.1 %
MCH RBC QN AUTO: 29.9 PG (ref 26–34)
MCHC RBC AUTO-ENTMCNC: 33.8 G/DL (ref 31–36)
MCV RBC AUTO: 88.5 FL (ref 80–100)
MONOCYTES ABSOLUTE: 0.7 K/UL (ref 0–1.3)
MONOCYTES RELATIVE PERCENT: 9.3 %
NEUTROPHILS ABSOLUTE: 3.2 K/UL (ref 1.7–7.7)
NEUTROPHILS RELATIVE PERCENT: 44.5 %
PDW BLD-RTO: 14.7 % (ref 12.4–15.4)
PLATELET # BLD: 289 K/UL (ref 135–450)
PMV BLD AUTO: 7.6 FL (ref 5–10.5)
POTASSIUM REFLEX MAGNESIUM: 4.5 MMOL/L (ref 3.5–5.1)
RBC # BLD: 5.68 M/UL (ref 4.2–5.9)
SODIUM BLD-SCNC: 140 MMOL/L (ref 136–145)
TOTAL PROTEIN: 7.2 G/DL (ref 6.4–8.2)
TROPONIN: 0.01 NG/ML
TROPONIN: <0.01 NG/ML
WBC # BLD: 7.2 K/UL (ref 4–11)

## 2020-04-24 PROCEDURE — 96375 TX/PRO/DX INJ NEW DRUG ADDON: CPT

## 2020-04-24 PROCEDURE — 83690 ASSAY OF LIPASE: CPT

## 2020-04-24 PROCEDURE — 93005 ELECTROCARDIOGRAM TRACING: CPT | Performed by: PHYSICIAN ASSISTANT

## 2020-04-24 PROCEDURE — 84484 ASSAY OF TROPONIN QUANT: CPT

## 2020-04-24 PROCEDURE — 80048 BASIC METABOLIC PNL TOTAL CA: CPT

## 2020-04-24 PROCEDURE — 36415 COLL VENOUS BLD VENIPUNCTURE: CPT

## 2020-04-24 PROCEDURE — 85025 COMPLETE CBC W/AUTO DIFF WBC: CPT

## 2020-04-24 PROCEDURE — 96374 THER/PROPH/DIAG INJ IV PUSH: CPT

## 2020-04-24 PROCEDURE — 6360000002 HC RX W HCPCS: Performed by: PHYSICIAN ASSISTANT

## 2020-04-24 PROCEDURE — 6370000000 HC RX 637 (ALT 250 FOR IP): Performed by: PHYSICIAN ASSISTANT

## 2020-04-24 PROCEDURE — 99284 EMERGENCY DEPT VISIT MOD MDM: CPT

## 2020-04-24 PROCEDURE — 80076 HEPATIC FUNCTION PANEL: CPT

## 2020-04-24 RX ORDER — BISMUTH SUBSALICYLATE 262 MG/1
524 TABLET, CHEWABLE ORAL ONCE
Status: COMPLETED | OUTPATIENT
Start: 2020-04-24 | End: 2020-04-24

## 2020-04-24 RX ORDER — BUPROPION HYDROCHLORIDE 150 MG/1
TABLET, EXTENDED RELEASE ORAL
Qty: 60 TABLET | Refills: 3 | Status: SHIPPED
Start: 2020-04-24 | End: 2020-12-21

## 2020-04-24 RX ORDER — KETOROLAC TROMETHAMINE 30 MG/ML
15 INJECTION, SOLUTION INTRAMUSCULAR; INTRAVENOUS ONCE
Status: COMPLETED | OUTPATIENT
Start: 2020-04-24 | End: 2020-04-24

## 2020-04-24 RX ORDER — METOCLOPRAMIDE HYDROCHLORIDE 5 MG/ML
10 INJECTION INTRAMUSCULAR; INTRAVENOUS ONCE
Status: COMPLETED | OUTPATIENT
Start: 2020-04-24 | End: 2020-04-24

## 2020-04-24 RX ORDER — ONDANSETRON 2 MG/ML
4 INJECTION INTRAMUSCULAR; INTRAVENOUS ONCE
Status: COMPLETED | OUTPATIENT
Start: 2020-04-24 | End: 2020-04-24

## 2020-04-24 RX ORDER — FAMOTIDINE 20 MG/1
20 TABLET, FILM COATED ORAL ONCE
Status: COMPLETED | OUTPATIENT
Start: 2020-04-24 | End: 2020-04-24

## 2020-04-24 RX ADMIN — LIDOCAINE HYDROCHLORIDE: 20 SOLUTION ORAL; TOPICAL at 12:34

## 2020-04-24 RX ADMIN — ONDANSETRON 4 MG: 2 INJECTION INTRAMUSCULAR; INTRAVENOUS at 10:09

## 2020-04-24 RX ADMIN — FAMOTIDINE 20 MG: 20 TABLET, FILM COATED ORAL at 10:02

## 2020-04-24 RX ADMIN — METOCLOPRAMIDE HYDROCHLORIDE 10 MG: 5 INJECTION INTRAMUSCULAR; INTRAVENOUS at 11:10

## 2020-04-24 RX ADMIN — KETOROLAC TROMETHAMINE 15 MG: 30 INJECTION, SOLUTION INTRAMUSCULAR at 10:33

## 2020-04-24 RX ADMIN — HYDROMORPHONE HYDROCHLORIDE 0.5 MG: 1 INJECTION, SOLUTION INTRAMUSCULAR; INTRAVENOUS; SUBCUTANEOUS at 11:38

## 2020-04-24 RX ADMIN — BISMUTH SUBSALICYLATE 524 MG: 262 TABLET, CHEWABLE ORAL at 12:34

## 2020-04-24 RX ADMIN — LIDOCAINE HYDROCHLORIDE: 20 SOLUTION ORAL; TOPICAL at 10:02

## 2020-04-24 ASSESSMENT — PAIN SCALES - GENERAL
PAINLEVEL_OUTOF10: 9
PAINLEVEL_OUTOF10: 8
PAINLEVEL_OUTOF10: 5
PAINLEVEL_OUTOF10: 10
PAINLEVEL_OUTOF10: 10

## 2020-04-24 NOTE — ED PROVIDER NOTES
ED Attending Attestation Note     Date of evaluation: 4/24/2020    This patient was seen by the advance practice provider. I have seen and examined the patient, agree with the workup, evaluation, management and diagnosis. The care plan has been discussed. I have reviewed the ECG and concur with the YNES's interpretation. My assessment reveals 49-year-old male with GERD presenting with epigastric pain and nausea/vomiting. No fevers or other concerning symptoms. He is followed with GI in the past and is currently on Prilosec, but for asked to get today. EKG, labs were both unremarkable. Abdominal exam was benign. I do not feel like he has acute abdominal pathology, and symptoms more likely related to his underlying GI issues. Patient felt better after meds, and tolerated p.o. intake. Patient discharged home, and will follow up with GI.     Courtney Israel MD  Emergency Medicine       Laura Villa MD  04/24/20 0516

## 2020-04-24 NOTE — ED PROVIDER NOTES
Medication List as of 4/24/2020 12:44 PM      CONTINUE these medications which have NOT CHANGED    Details   omeprazole (PRILOSEC) 20 MG delayed release capsule TAKE 1 CAPSULE BY MOUTH 2 TIMES A DAY WITH MEALS, Disp-60 capsule, R-1Normal      atorvastatin (LIPITOR) 80 MG tablet Take 1 tablet by mouth daily, Disp-30 tablet, R-5Normal      diclofenac sodium 1 % GEL Apply 4 g topically 4 times daily, Topical, 4 TIMES DAILY Starting Tue 11/26/2019, Disp-4 Tube, R-1, Normal      buPROPion (ZYBAN) 150 MG extended release tablet Take 1 tablet by mouth 2 times daily, Disp-60 tablet, R-3Normal      aspirin 325 MG tablet Take 325 mg by mouth dailyHistorical Med      dicyclomine (BENTYL) 10 MG capsule Take 1 capsule by mouth 3 times daily as needed (abdominal pain), Disp-120 capsule, R-3Print             Allergies     He is allergic to pcn [penicillins]. Physical Exam     INITIAL VITALS: BP: (!) 158/102, Temp: 97.8 °F (36.6 °C), Pulse: 85, Resp: 14, SpO2: 100 %  Physical Exam  Constitutional:       Appearance: Normal appearance. He is normal weight. He is not ill-appearing, toxic-appearing or diaphoretic. Comments: Appears uncomfortable, and on the side of the bed holding his epigastric region. HENT:      Head: Normocephalic and atraumatic. Nose: Nose normal.      Mouth/Throat:      Mouth: Mucous membranes are moist.      Pharynx: Oropharynx is clear. Eyes:      Conjunctiva/sclera: Conjunctivae normal.   Neck:      Musculoskeletal: Normal range of motion. Cardiovascular:      Rate and Rhythm: Normal rate. Pulmonary:      Effort: Pulmonary effort is normal. No respiratory distress. Abdominal:      General: Abdomen is flat. Palpations: Abdomen is soft. Tenderness: There is abdominal tenderness (The gastric and right upper quadrant). There is guarding (Voluntary). There is no right CVA tenderness, left CVA tenderness or rebound. Musculoskeletal: Normal range of motion.          General: No swelling or tenderness. Skin:     General: Skin is warm and dry. Neurological:      General: No focal deficit present. Mental Status: He is alert and oriented to person, place, and time.           Diagnostic Results     EKG   Interpreted in conjunction with emergency department physician Sd Martinez MD  Rhythm: normal sinus   Rate: normal  Axis: normal  Ectopy: none  Conduction: normal  ST Segments: normal  T Waves: normal  Q Waves:none  Clinical Impression: no acute changes, NSR    RADIOLOGY:  No orders to display       LABS:   Results for orders placed or performed during the hospital encounter of 04/24/20   CBC Auto Differential   Result Value Ref Range    WBC 7.2 4.0 - 11.0 K/uL    RBC 5.68 4.20 - 5.90 M/uL    Hemoglobin 17.0 13.5 - 17.5 g/dL    Hematocrit 50.2 40.5 - 52.5 %    MCV 88.5 80.0 - 100.0 fL    MCH 29.9 26.0 - 34.0 pg    MCHC 33.8 31.0 - 36.0 g/dL    RDW 14.7 12.4 - 15.4 %    Platelets 559 316 - 955 K/uL    MPV 7.6 5.0 - 10.5 fL    Neutrophils % 44.5 %    Lymphocytes % 36.1 %    Monocytes % 9.3 %    Eosinophils % 8.7 %    Basophils % 1.4 %    Neutrophils Absolute 3.2 1.7 - 7.7 K/uL    Lymphocytes Absolute 2.6 1.0 - 5.1 K/uL    Monocytes Absolute 0.7 0.0 - 1.3 K/uL    Eosinophils Absolute 0.6 0.0 - 0.6 K/uL    Basophils Absolute 0.1 0.0 - 0.2 K/uL   Basic Metabolic Panel w/ Reflex to MG   Result Value Ref Range    Sodium 140 136 - 145 mmol/L    Potassium reflex Magnesium 4.5 3.5 - 5.1 mmol/L    Chloride 103 99 - 110 mmol/L    CO2 25 21 - 32 mmol/L    Anion Gap 12 3 - 16    Glucose 111 (H) 70 - 99 mg/dL    BUN 16 7 - 20 mg/dL    CREATININE 0.9 0.9 - 1.3 mg/dL    GFR Non-African American >60 >60    GFR African American >60 >60    Calcium 9.8 8.3 - 10.6 mg/dL   Hepatic Function Panel   Result Value Ref Range    Total Protein 7.2 6.4 - 8.2 g/dL    Alb 4.4 3.4 - 5.0 g/dL    Alkaline Phosphatase 115 40 - 129 U/L    ALT 39 10 - 40 U/L    AST 30 15 - 37 U/L    Total Bilirubin 0.4 0.0 - 1.0 mg/dL

## 2020-04-24 NOTE — ED NOTES
Pt unhappy with the choice of pain medication stating Queta Person are you making me a test animal with this toradol stuff, I know dilaudid works. \" MD aware     Carlena Cogan, RN  04/24/20 1037

## 2020-04-24 NOTE — ED NOTES
Pt refused reglan and requesting to speak with PA or MD. Faby Failing PA aware.      Jenni Gavin, RN  04/24/20 1100

## 2020-05-26 ENCOUNTER — HOSPITAL ENCOUNTER (EMERGENCY)
Age: 55
Discharge: HOME | End: 2020-05-26
Attending: EMERGENCY MEDICINE

## 2020-05-26 VITALS
TEMPERATURE: 98.4 F | OXYGEN SATURATION: 100 % | HEART RATE: 84 BPM | BODY MASS INDEX: 27.06 KG/M2 | DIASTOLIC BLOOD PRESSURE: 78 MMHG | HEIGHT: 70 IN | SYSTOLIC BLOOD PRESSURE: 138 MMHG | RESPIRATION RATE: 18 BRPM | WEIGHT: 189 LBS

## 2020-05-26 LAB
A/G RATIO: 1.6 (ref 1.1–2.2)
ALBUMIN SERPL-MCNC: 4.4 G/DL (ref 3.4–5)
ALP BLD-CCNC: 113 U/L (ref 40–129)
ALT SERPL-CCNC: 30 U/L (ref 10–40)
ANION GAP SERPL CALCULATED.3IONS-SCNC: 11 MMOL/L (ref 3–16)
AST SERPL-CCNC: 34 U/L (ref 15–37)
BASOPHILS ABSOLUTE: 0 K/UL (ref 0–0.2)
BASOPHILS RELATIVE PERCENT: 0.3 %
BILIRUB SERPL-MCNC: 0.4 MG/DL (ref 0–1)
BUN BLDV-MCNC: 16 MG/DL (ref 7–20)
CALCIUM SERPL-MCNC: 9.2 MG/DL (ref 8.3–10.6)
CHLORIDE BLD-SCNC: 102 MMOL/L (ref 99–110)
CO2: 24 MMOL/L (ref 21–32)
CREAT SERPL-MCNC: 1 MG/DL (ref 0.9–1.3)
EOSINOPHILS ABSOLUTE: 0.8 K/UL (ref 0–0.6)
EOSINOPHILS RELATIVE PERCENT: 11.3 %
GFR AFRICAN AMERICAN: >60
GFR NON-AFRICAN AMERICAN: >60
GLOBULIN: 2.8 G/DL
GLUCOSE BLD-MCNC: 120 MG/DL (ref 70–99)
HCT VFR BLD CALC: 47.7 % (ref 40.5–52.5)
HEMOGLOBIN: 16.2 G/DL (ref 13.5–17.5)
LYMPHOCYTES ABSOLUTE: 1.7 K/UL (ref 1–5.1)
LYMPHOCYTES RELATIVE PERCENT: 25.4 %
MCH RBC QN AUTO: 30.1 PG (ref 26–34)
MCHC RBC AUTO-ENTMCNC: 34 G/DL (ref 31–36)
MCV RBC AUTO: 88.5 FL (ref 80–100)
MONOCYTES ABSOLUTE: 0.6 K/UL (ref 0–1.3)
MONOCYTES RELATIVE PERCENT: 9.3 %
NEUTROPHILS ABSOLUTE: 3.6 K/UL (ref 1.7–7.7)
NEUTROPHILS RELATIVE PERCENT: 53.7 %
PDW BLD-RTO: 14.3 % (ref 12.4–15.4)
PLATELET # BLD: 256 K/UL (ref 135–450)
PMV BLD AUTO: 7.8 FL (ref 5–10.5)
POTASSIUM REFLEX MAGNESIUM: 4.5 MMOL/L (ref 3.5–5.1)
RBC # BLD: 5.4 M/UL (ref 4.2–5.9)
SODIUM BLD-SCNC: 137 MMOL/L (ref 136–145)
TOTAL PROTEIN: 7.2 G/DL (ref 6.4–8.2)
WBC # BLD: 6.7 K/UL (ref 4–11)

## 2020-05-26 PROCEDURE — 36415 COLL VENOUS BLD VENIPUNCTURE: CPT

## 2020-05-26 PROCEDURE — 96375 TX/PRO/DX INJ NEW DRUG ADDON: CPT

## 2020-05-26 PROCEDURE — 6360000002 HC RX W HCPCS: Performed by: EMERGENCY MEDICINE

## 2020-05-26 PROCEDURE — 96374 THER/PROPH/DIAG INJ IV PUSH: CPT

## 2020-05-26 PROCEDURE — 80053 COMPREHEN METABOLIC PANEL: CPT

## 2020-05-26 PROCEDURE — 99284 EMERGENCY DEPT VISIT MOD MDM: CPT

## 2020-05-26 PROCEDURE — 2500000003 HC RX 250 WO HCPCS: Performed by: EMERGENCY MEDICINE

## 2020-05-26 PROCEDURE — 85025 COMPLETE CBC W/AUTO DIFF WBC: CPT

## 2020-05-26 RX ORDER — ONDANSETRON 2 MG/ML
4 INJECTION INTRAMUSCULAR; INTRAVENOUS ONCE
Status: COMPLETED | OUTPATIENT
Start: 2020-05-26 | End: 2020-05-26

## 2020-05-26 RX ADMIN — HYDROMORPHONE HYDROCHLORIDE 1 MG: 1 INJECTION, SOLUTION INTRAMUSCULAR; INTRAVENOUS; SUBCUTANEOUS at 08:40

## 2020-05-26 RX ADMIN — FAMOTIDINE 20 MG: 10 INJECTION INTRAVENOUS at 08:40

## 2020-05-26 RX ADMIN — ONDANSETRON 4 MG: 2 INJECTION INTRAMUSCULAR; INTRAVENOUS at 08:40

## 2020-05-26 ASSESSMENT — PAIN DESCRIPTION - PAIN TYPE: TYPE: ACUTE PAIN

## 2020-05-26 ASSESSMENT — PAIN SCALES - GENERAL
PAINLEVEL_OUTOF10: 8
PAINLEVEL_OUTOF10: 8

## 2020-05-26 ASSESSMENT — PAIN DESCRIPTION - LOCATION: LOCATION: ABDOMEN

## 2020-05-26 ASSESSMENT — PAIN DESCRIPTION - ORIENTATION: ORIENTATION: MID

## 2020-05-26 ASSESSMENT — ENCOUNTER SYMPTOMS
SHORTNESS OF BREATH: 0
TROUBLE SWALLOWING: 0
COUGH: 0

## 2020-05-26 ASSESSMENT — PAIN DESCRIPTION - DESCRIPTORS: DESCRIPTORS: ACHING;CRAMPING

## 2020-05-29 RX ORDER — OMEPRAZOLE 20 MG/1
CAPSULE, DELAYED RELEASE ORAL
Qty: 60 CAPSULE | Refills: 1 | Status: SHIPPED | OUTPATIENT
Start: 2020-05-29 | End: 2020-07-31

## 2020-06-29 RX ORDER — ATORVASTATIN CALCIUM 80 MG/1
80 TABLET, FILM COATED ORAL DAILY
Qty: 30 TABLET | Refills: 1 | Status: SHIPPED | OUTPATIENT
Start: 2020-06-29 | End: 2020-09-28 | Stop reason: SDUPTHER

## 2020-07-31 RX ORDER — OMEPRAZOLE 20 MG/1
CAPSULE, DELAYED RELEASE ORAL
Qty: 60 CAPSULE | Refills: 1 | Status: SHIPPED | OUTPATIENT
Start: 2020-07-31 | End: 2020-09-28

## 2020-09-28 RX ORDER — ATORVASTATIN CALCIUM 80 MG/1
80 TABLET, FILM COATED ORAL DAILY
Qty: 30 TABLET | Refills: 1 | Status: SHIPPED | OUTPATIENT
Start: 2020-09-28 | End: 2020-12-09 | Stop reason: SDUPTHER

## 2020-09-28 RX ORDER — OMEPRAZOLE 20 MG/1
CAPSULE, DELAYED RELEASE ORAL
Qty: 60 CAPSULE | Refills: 1 | Status: SHIPPED | OUTPATIENT
Start: 2020-09-28 | End: 2020-12-09 | Stop reason: SDUPTHER

## 2020-09-28 NOTE — TELEPHONE ENCOUNTER
Patient needs refill on omeprazole (PRILOSEC) 20 MG delayed release capsule   Last filled- 07/31/2020    atorvastatin (LIPITOR) 80 MG tablet  Last filled-06/29/2020    Last OV- 11/26/2019 KATHARINE Salter

## 2020-12-09 RX ORDER — OMEPRAZOLE 20 MG/1
CAPSULE, DELAYED RELEASE ORAL
Qty: 60 CAPSULE | Refills: 3 | Status: SHIPPED | OUTPATIENT
Start: 2020-12-09 | End: 2021-01-12 | Stop reason: SDUPTHER

## 2020-12-09 RX ORDER — ATORVASTATIN CALCIUM 80 MG/1
80 TABLET, FILM COATED ORAL DAILY
Qty: 30 TABLET | Refills: 3 | Status: SHIPPED | OUTPATIENT
Start: 2020-12-09

## 2020-12-21 ENCOUNTER — OFFICE VISIT (OUTPATIENT)
Dept: INTERNAL MEDICINE CLINIC | Age: 55
End: 2020-12-21

## 2020-12-21 VITALS
HEIGHT: 70 IN | BODY MASS INDEX: 27.49 KG/M2 | SYSTOLIC BLOOD PRESSURE: 121 MMHG | HEART RATE: 82 BPM | OXYGEN SATURATION: 95 % | DIASTOLIC BLOOD PRESSURE: 83 MMHG | TEMPERATURE: 97.5 F | WEIGHT: 192 LBS

## 2020-12-21 PROCEDURE — 99213 OFFICE O/P EST LOW 20 MIN: CPT | Performed by: STUDENT IN AN ORGANIZED HEALTH CARE EDUCATION/TRAINING PROGRAM

## 2020-12-21 ASSESSMENT — ENCOUNTER SYMPTOMS
EYES NEGATIVE: 1
RESPIRATORY NEGATIVE: 1
GASTROINTESTINAL NEGATIVE: 1

## 2020-12-21 NOTE — PROGRESS NOTES
2020     HPI  Brenna Cabrera (:  1965) is a 60 y.o. male with PMHx of HLD, GERD, and tobacco abuse who presents for a follow up visit. He reports he has been doing well and not in any acute distress lately. Patient has been complaint with all medications. Denies any fevers, chills, nausea, vomiting, chest pain, SOB, or lower extremity edema. He does have GERD and complains that it has not improved. Review of Systems   Constitutional: Negative. HENT: Negative. Eyes: Negative. Respiratory: Negative. Cardiovascular: Negative. Gastrointestinal: Negative. Genitourinary: Negative for difficulty urinating. Musculoskeletal: Negative. Neurological: Negative. Psychiatric/Behavioral: Negative. Prior to Visit Medications    Medication Sig Taking? Authorizing Provider   atorvastatin (LIPITOR) 80 MG tablet Take 1 tablet by mouth daily  Ashwin Merritt MD   omeprazole (PRILOSEC) 20 MG delayed release capsule TAKE 1 CAPSULE BY MOUTH 2 TIMES A DAY WITH MEALS  Ashwin Merritt MD   buPROPion (ZYBAN) 150 MG extended release tablet TAKE 1 TABLET BY MOUTH 2 TIMES A DAY  Kelli Roper MD   diclofenac sodium 1 % GEL Apply 4 g topically 4 times daily  Eric Miller DO   aspirin 325 MG tablet Take 325 mg by mouth daily  Historical Provider, MD   dicyclomine (BENTYL) 10 MG capsule Take 1 capsule by mouth 3 times daily as needed (abdominal pain)  Landy Waldrop MD        Social History     Tobacco Use    Smoking status: Current Every Day Smoker     Packs/day: 0.75    Smokeless tobacco: Never Used   Substance Use Topics    Alcohol use: Yes     Comment: \"weekends watching football\"        There were no vitals filed for this visit. Estimated body mass index is 27.12 kg/m² as calculated from the following:    Height as of 20: 5' 10\" (1.778 m). Weight as of 20: 189 lb (85.7 kg). Physical Exam  Constitutional:       Appearance: Normal appearance.    HENT: Head: Normocephalic and atraumatic. Mouth/Throat:      Mouth: Mucous membranes are moist.   Eyes:      Pupils: Pupils are equal, round, and reactive to light. Cardiovascular:      Rate and Rhythm: Normal rate and regular rhythm. Pulses: Normal pulses. Pulmonary:      Effort: Pulmonary effort is normal.      Breath sounds: Normal breath sounds. Abdominal:      General: Abdomen is flat. Bowel sounds are normal. There is no distension. Palpations: There is no mass. Musculoskeletal: Normal range of motion. General: No swelling or tenderness. Skin:     General: Skin is warm. Coloration: Skin is not jaundiced or pale. Neurological:      General: No focal deficit present. Mental Status: He is oriented to person, place, and time. ASSESSMENT/PLAN:  1. Healthcare maintenance  - LIPID PANEL; Future  - CBC WITH AUTO DIFFERENTIAL; Future  - COMPREHENSIVE METABOLIC PANEL; Future  - HIV-1 AND HIV-2 ANTIBODIES; Future    2. Tobacco abuse:  - continue bupropion     3. HLD:  - Continue lipitor 80 mg daily     4. Hx of TIA:  - Follows with Neurology     5. GERD:  - continue prilosec       Return in about 6 months (around 6/21/2021). An electronic signature was used to authenticate this note.     --Golden Curry MD on 12/21/2020 at 11:11 AM

## 2021-01-12 DIAGNOSIS — K21.9 GASTROESOPHAGEAL REFLUX DISEASE: ICD-10-CM

## 2021-01-12 RX ORDER — OMEPRAZOLE 20 MG/1
CAPSULE, DELAYED RELEASE ORAL
Qty: 60 CAPSULE | Refills: 3 | Status: SHIPPED | OUTPATIENT
Start: 2021-01-12 | End: 2021-01-13 | Stop reason: CLARIF

## 2021-01-13 ENCOUNTER — TELEPHONE (OUTPATIENT)
Dept: INTERNAL MEDICINE CLINIC | Age: 56
End: 2021-01-13

## 2021-01-13 RX ORDER — PANTOPRAZOLE SODIUM 20 MG/1
20 TABLET, DELAYED RELEASE ORAL
Qty: 90 TABLET | Refills: 1 | Status: SHIPPED | OUTPATIENT
Start: 2021-01-13 | End: 2021-01-15 | Stop reason: CLARIF

## 2021-01-13 NOTE — TELEPHONE ENCOUNTER
Insurance  no longer covers Omeprazole.  orders Protonix 20mg take one daily before breakfast.  Left message on patient's voice mail.

## 2021-01-15 DIAGNOSIS — K21.9 GASTROESOPHAGEAL REFLUX DISEASE: ICD-10-CM

## 2021-01-15 PROBLEM — K21.00 GERD WITH ESOPHAGITIS: Status: ACTIVE | Noted: 2021-01-15

## 2021-01-15 PROBLEM — K20.90 ESOPHAGITIS: Status: ACTIVE | Noted: 2021-01-15

## 2021-01-15 NOTE — TELEPHONE ENCOUNTER
called Insurance for PA: Omeprazole approved for 3 yrs 90 day supplies at $16.00 for 90 days. Patient already p/u 1 month supply of Protonix pending this approval.  [facility] will need new script for Omeprazole 90 days. Left message on patient's voice mail.

## 2021-01-19 RX ORDER — OMEPRAZOLE 20 MG/1
CAPSULE, DELAYED RELEASE ORAL
Qty: 180 CAPSULE | Refills: 5 | OUTPATIENT
Start: 2021-01-19

## 2021-02-01 RX ORDER — OMEPRAZOLE 20 MG/1
20 CAPSULE, DELAYED RELEASE ORAL 2 TIMES DAILY
Qty: 180 CAPSULE | Refills: 1 | Status: SHIPPED | OUTPATIENT
Start: 2021-02-01

## 2021-02-01 RX ORDER — OMEPRAZOLE 20 MG/1
20 CAPSULE, DELAYED RELEASE ORAL
Qty: 90 CAPSULE | Refills: 1 | Status: SHIPPED | OUTPATIENT
Start: 2021-02-01 | End: 2021-02-01 | Stop reason: CLARIF

## 2021-12-27 ENCOUNTER — APPOINTMENT (OUTPATIENT)
Dept: CT IMAGING | Age: 56
DRG: 072 | End: 2021-12-27
Payer: COMMERCIAL

## 2021-12-27 ENCOUNTER — HOSPITAL ENCOUNTER (INPATIENT)
Age: 56
LOS: 1 days | Discharge: HOME | DRG: 072 | End: 2021-12-28
Attending: EMERGENCY MEDICINE | Admitting: INTERNAL MEDICINE
Payer: COMMERCIAL

## 2021-12-27 DIAGNOSIS — G45.4 AMNESIA, GLOBAL, TRANSIENT: Primary | ICD-10-CM

## 2021-12-27 LAB
ANION GAP SERPL CALCULATED.3IONS-SCNC: 15 MMOL/L (ref 3–16)
BASOPHILS ABSOLUTE: 0.1 K/UL (ref 0–0.2)
BASOPHILS RELATIVE PERCENT: 0.8 %
BUN BLDV-MCNC: 14 MG/DL (ref 7–20)
CALCIUM SERPL-MCNC: 9 MG/DL (ref 8.3–10.6)
CHLORIDE BLD-SCNC: 106 MMOL/L (ref 99–110)
CO2: 19 MMOL/L (ref 21–32)
CREAT SERPL-MCNC: 1 MG/DL (ref 0.9–1.3)
EOSINOPHILS ABSOLUTE: 0 K/UL (ref 0–0.6)
EOSINOPHILS RELATIVE PERCENT: 0.4 %
GFR AFRICAN AMERICAN: >60
GFR NON-AFRICAN AMERICAN: >60
GLUCOSE BLD-MCNC: 87 MG/DL (ref 70–99)
GLUCOSE BLD-MCNC: 98 MG/DL (ref 70–99)
HCT VFR BLD CALC: 49.1 % (ref 40.5–52.5)
HEMOGLOBIN: 16.6 G/DL (ref 13.5–17.5)
LYMPHOCYTES ABSOLUTE: 1.8 K/UL (ref 1–5.1)
LYMPHOCYTES RELATIVE PERCENT: 18.7 %
MCH RBC QN AUTO: 30.4 PG (ref 26–34)
MCHC RBC AUTO-ENTMCNC: 33.8 G/DL (ref 31–36)
MCV RBC AUTO: 90 FL (ref 80–100)
MONOCYTES ABSOLUTE: 0.4 K/UL (ref 0–1.3)
MONOCYTES RELATIVE PERCENT: 4.4 %
NEUTROPHILS ABSOLUTE: 7.1 K/UL (ref 1.7–7.7)
NEUTROPHILS RELATIVE PERCENT: 75.7 %
PDW BLD-RTO: 14.9 % (ref 12.4–15.4)
PERFORMED ON: NORMAL
PLATELET # BLD: 330 K/UL (ref 135–450)
PMV BLD AUTO: 7.9 FL (ref 5–10.5)
POTASSIUM REFLEX MAGNESIUM: 4 MMOL/L (ref 3.5–5.1)
RBC # BLD: 5.46 M/UL (ref 4.2–5.9)
SODIUM BLD-SCNC: 140 MMOL/L (ref 136–145)
WBC # BLD: 9.4 K/UL (ref 4–11)

## 2021-12-27 PROCEDURE — 36415 COLL VENOUS BLD VENIPUNCTURE: CPT

## 2021-12-27 PROCEDURE — 81003 URINALYSIS AUTO W/O SCOPE: CPT

## 2021-12-27 PROCEDURE — 99283 EMERGENCY DEPT VISIT LOW MDM: CPT

## 2021-12-27 PROCEDURE — 85025 COMPLETE CBC W/AUTO DIFF WBC: CPT

## 2021-12-27 PROCEDURE — 80048 BASIC METABOLIC PNL TOTAL CA: CPT

## 2021-12-27 ASSESSMENT — ENCOUNTER SYMPTOMS
SHORTNESS OF BREATH: 0
NAUSEA: 0
VOMITING: 0
ABDOMINAL PAIN: 0

## 2021-12-27 NOTE — Clinical Note
Patient Class: Inpatient [101]   REQUIRED: Diagnosis: Altered mental status [780.97. ICD-9-CM]   Estimated Length of Stay: Estimated stay of more than 2 midnights   Telemetry/Cardiac Monitoring Required?: Yes

## 2021-12-28 ENCOUNTER — APPOINTMENT (OUTPATIENT)
Dept: CT IMAGING | Age: 56
DRG: 072 | End: 2021-12-28
Payer: COMMERCIAL

## 2021-12-28 ENCOUNTER — APPOINTMENT (OUTPATIENT)
Dept: MRI IMAGING | Age: 56
DRG: 072 | End: 2021-12-28
Payer: COMMERCIAL

## 2021-12-28 VITALS
SYSTOLIC BLOOD PRESSURE: 121 MMHG | DIASTOLIC BLOOD PRESSURE: 90 MMHG | OXYGEN SATURATION: 96 % | RESPIRATION RATE: 18 BRPM | TEMPERATURE: 97.8 F | HEART RATE: 77 BPM

## 2021-12-28 PROBLEM — R41.82 ALTERED MENTAL STATUS: Status: ACTIVE | Noted: 2021-12-28

## 2021-12-28 PROBLEM — F17.200 SMOKING: Status: ACTIVE | Noted: 2021-12-28

## 2021-12-28 PROBLEM — G45.4 TRANSIENT GLOBAL AMNESIA: Status: ACTIVE | Noted: 2021-12-28

## 2021-12-28 LAB
ANION GAP SERPL CALCULATED.3IONS-SCNC: 9 MMOL/L (ref 3–16)
BASOPHILS ABSOLUTE: 0.1 K/UL (ref 0–0.2)
BASOPHILS RELATIVE PERCENT: 0.8 %
BILIRUBIN URINE: NEGATIVE
BLOOD, URINE: NEGATIVE
BUN BLDV-MCNC: 13 MG/DL (ref 7–20)
CALCIUM SERPL-MCNC: 9 MG/DL (ref 8.3–10.6)
CHLORIDE BLD-SCNC: 105 MMOL/L (ref 99–110)
CLARITY: CLEAR
CO2: 25 MMOL/L (ref 21–32)
COLOR: YELLOW
CREAT SERPL-MCNC: 1 MG/DL (ref 0.9–1.3)
EKG ATRIAL RATE: 74 BPM
EKG DIAGNOSIS: NORMAL
EKG P AXIS: 62 DEGREES
EKG P-R INTERVAL: 160 MS
EKG Q-T INTERVAL: 372 MS
EKG QRS DURATION: 86 MS
EKG QTC CALCULATION (BAZETT): 412 MS
EKG R AXIS: 72 DEGREES
EKG T AXIS: 65 DEGREES
EKG VENTRICULAR RATE: 74 BPM
EOSINOPHILS ABSOLUTE: 0.3 K/UL (ref 0–0.6)
EOSINOPHILS RELATIVE PERCENT: 3 %
GFR AFRICAN AMERICAN: >60
GFR NON-AFRICAN AMERICAN: >60
GLUCOSE BLD-MCNC: 90 MG/DL (ref 70–99)
GLUCOSE URINE: NEGATIVE MG/DL
HCT VFR BLD CALC: 47.9 % (ref 40.5–52.5)
HEMOGLOBIN: 16.1 G/DL (ref 13.5–17.5)
KETONES, URINE: NEGATIVE MG/DL
LEUKOCYTE ESTERASE, URINE: NEGATIVE
LV EF: 58 %
LVEF MODALITY: NORMAL
LYMPHOCYTES ABSOLUTE: 1.8 K/UL (ref 1–5.1)
LYMPHOCYTES RELATIVE PERCENT: 20.3 %
MCH RBC QN AUTO: 30.5 PG (ref 26–34)
MCHC RBC AUTO-ENTMCNC: 33.7 G/DL (ref 31–36)
MCV RBC AUTO: 90.6 FL (ref 80–100)
MICROSCOPIC EXAMINATION: NORMAL
MONOCYTES ABSOLUTE: 0.7 K/UL (ref 0–1.3)
MONOCYTES RELATIVE PERCENT: 8.3 %
NEUTROPHILS ABSOLUTE: 6 K/UL (ref 1.7–7.7)
NEUTROPHILS RELATIVE PERCENT: 67.6 %
NITRITE, URINE: NEGATIVE
PDW BLD-RTO: 14.9 % (ref 12.4–15.4)
PH UA: 6 (ref 5–8)
PLATELET # BLD: 300 K/UL (ref 135–450)
PMV BLD AUTO: 7.6 FL (ref 5–10.5)
POTASSIUM REFLEX MAGNESIUM: 4 MMOL/L (ref 3.5–5.1)
PROTEIN UA: NEGATIVE MG/DL
RBC # BLD: 5.29 M/UL (ref 4.2–5.9)
SODIUM BLD-SCNC: 139 MMOL/L (ref 136–145)
SPECIFIC GRAVITY UA: >=1.03 (ref 1–1.03)
URINE TYPE: NORMAL
UROBILINOGEN, URINE: 0.2 E.U./DL
WBC # BLD: 8.9 K/UL (ref 4–11)

## 2021-12-28 PROCEDURE — 6370000000 HC RX 637 (ALT 250 FOR IP): Performed by: PHYSICIAN ASSISTANT

## 2021-12-28 PROCEDURE — 93306 TTE W/DOPPLER COMPLETE: CPT

## 2021-12-28 PROCEDURE — 99223 1ST HOSP IP/OBS HIGH 75: CPT | Performed by: PSYCHIATRY & NEUROLOGY

## 2021-12-28 PROCEDURE — 70450 CT HEAD/BRAIN W/O DYE: CPT

## 2021-12-28 PROCEDURE — 70551 MRI BRAIN STEM W/O DYE: CPT

## 2021-12-28 PROCEDURE — 70496 CT ANGIOGRAPHY HEAD: CPT

## 2021-12-28 PROCEDURE — 99285 EMERGENCY DEPT VISIT HI MDM: CPT | Performed by: INTERNAL MEDICINE

## 2021-12-28 PROCEDURE — 6360000004 HC RX CONTRAST MEDICATION: Performed by: PHYSICIAN ASSISTANT

## 2021-12-28 PROCEDURE — 1200000000 HC SEMI PRIVATE

## 2021-12-28 PROCEDURE — 93005 ELECTROCARDIOGRAM TRACING: CPT | Performed by: INTERNAL MEDICINE

## 2021-12-28 PROCEDURE — 93010 ELECTROCARDIOGRAM REPORT: CPT | Performed by: INTERNAL MEDICINE

## 2021-12-28 PROCEDURE — 85025 COMPLETE CBC W/AUTO DIFF WBC: CPT

## 2021-12-28 PROCEDURE — 36415 COLL VENOUS BLD VENIPUNCTURE: CPT

## 2021-12-28 PROCEDURE — 80048 BASIC METABOLIC PNL TOTAL CA: CPT

## 2021-12-28 RX ORDER — NICOTINE 21 MG/24HR
1 PATCH, TRANSDERMAL 24 HOURS TRANSDERMAL ONCE
Status: DISCONTINUED | OUTPATIENT
Start: 2021-12-28 | End: 2021-12-28 | Stop reason: HOSPADM

## 2021-12-28 RX ORDER — ACETAMINOPHEN 325 MG/1
650 TABLET ORAL EVERY 6 HOURS PRN
Status: DISCONTINUED | OUTPATIENT
Start: 2021-12-28 | End: 2021-12-28 | Stop reason: HOSPADM

## 2021-12-28 RX ORDER — SODIUM CHLORIDE 9 MG/ML
25 INJECTION, SOLUTION INTRAVENOUS PRN
Status: DISCONTINUED | OUTPATIENT
Start: 2021-12-28 | End: 2021-12-28 | Stop reason: HOSPADM

## 2021-12-28 RX ORDER — ONDANSETRON 2 MG/ML
4 INJECTION INTRAMUSCULAR; INTRAVENOUS EVERY 6 HOURS PRN
Status: DISCONTINUED | OUTPATIENT
Start: 2021-12-28 | End: 2021-12-28 | Stop reason: HOSPADM

## 2021-12-28 RX ORDER — ONDANSETRON 4 MG/1
4 TABLET, ORALLY DISINTEGRATING ORAL EVERY 8 HOURS PRN
Status: DISCONTINUED | OUTPATIENT
Start: 2021-12-28 | End: 2021-12-28 | Stop reason: HOSPADM

## 2021-12-28 RX ORDER — HYDROXYZINE 50 MG/1
50 TABLET, FILM COATED ORAL NIGHTLY
Qty: 30 TABLET | Refills: 0 | Status: SHIPPED | OUTPATIENT
Start: 2021-12-28 | End: 2022-01-27

## 2021-12-28 RX ORDER — NICOTINE 21 MG/24HR
1 PATCH, TRANSDERMAL 24 HOURS TRANSDERMAL DAILY
Status: DISCONTINUED | OUTPATIENT
Start: 2021-12-29 | End: 2021-12-28 | Stop reason: HOSPADM

## 2021-12-28 RX ORDER — PANTOPRAZOLE SODIUM 40 MG/1
40 TABLET, DELAYED RELEASE ORAL
Status: DISCONTINUED | OUTPATIENT
Start: 2021-12-28 | End: 2021-12-28 | Stop reason: HOSPADM

## 2021-12-28 RX ORDER — PANTOPRAZOLE SODIUM 40 MG/1
40 TABLET, DELAYED RELEASE ORAL
Status: DISCONTINUED | OUTPATIENT
Start: 2021-12-28 | End: 2021-12-28

## 2021-12-28 RX ORDER — SODIUM CHLORIDE 0.9 % (FLUSH) 0.9 %
5-40 SYRINGE (ML) INJECTION EVERY 12 HOURS SCHEDULED
Status: DISCONTINUED | OUTPATIENT
Start: 2021-12-28 | End: 2021-12-28 | Stop reason: HOSPADM

## 2021-12-28 RX ORDER — BUPROPION HYDROCHLORIDE 300 MG/1
300 TABLET ORAL EVERY MORNING
Qty: 30 TABLET | Refills: 0 | Status: SHIPPED | OUTPATIENT
Start: 2021-12-28

## 2021-12-28 RX ORDER — ATORVASTATIN CALCIUM 40 MG/1
80 TABLET, FILM COATED ORAL DAILY
Status: DISCONTINUED | OUTPATIENT
Start: 2021-12-28 | End: 2021-12-28 | Stop reason: HOSPADM

## 2021-12-28 RX ORDER — POLYETHYLENE GLYCOL 3350 17 G/17G
17 POWDER, FOR SOLUTION ORAL DAILY PRN
Status: DISCONTINUED | OUTPATIENT
Start: 2021-12-28 | End: 2021-12-28 | Stop reason: HOSPADM

## 2021-12-28 RX ORDER — ASPIRIN 81 MG/1
81 TABLET ORAL DAILY
Qty: 30 TABLET | Refills: 0 | Status: SHIPPED | OUTPATIENT
Start: 2021-12-28

## 2021-12-28 RX ORDER — ACETAMINOPHEN 650 MG/1
650 SUPPOSITORY RECTAL EVERY 6 HOURS PRN
Status: DISCONTINUED | OUTPATIENT
Start: 2021-12-28 | End: 2021-12-28 | Stop reason: HOSPADM

## 2021-12-28 RX ORDER — SODIUM CHLORIDE 0.9 % (FLUSH) 0.9 %
5-40 SYRINGE (ML) INJECTION PRN
Status: DISCONTINUED | OUTPATIENT
Start: 2021-12-28 | End: 2021-12-28 | Stop reason: HOSPADM

## 2021-12-28 RX ADMIN — IOPAMIDOL 80 ML: 755 INJECTION, SOLUTION INTRAVENOUS at 00:43

## 2021-12-28 NOTE — CONSULTS
Neurology / Neurocritical Care Consult Note    Reason for Consult: Amnesia  Admission Chief Complaint: Amnesia      HPI:   Mr. Walt Nash is a 59 yo male with PMH ischemic CVA (2019, TGA), tobacco use who presented with amnesia. The span of his memory loss extends from [place] morning to presentation to the ED. Reports his family told him that he was repeatedly asking the same questions yesterday which is why his family was concerned and brought him in however he does not remember anything the couple days prior. Of note, pt has a history of a similar episode in 2019 where he presented with confusion and transient global amnesia. MOCA score 25/30, imaging at that time revealed a single tiny focus of acute infarct involving the hippocampus, EEG unremarkable. On presentation this visit, pt was back to his baseline mentation. He was hypertensive (150/115), tachycardic (108), other VSS. CT head nonrevealing, CTA head/neck with unruptured right ICA aneurysm (increased in size from 5.2mm in 2019 to 5.5mm now). MRI with no acute infarct. PAST MEDICAL HISTORY:  Past Medical History:   Diagnosis Date    Hyperlipidemia     TIA (transient ischemic attack)        ALLERGIES:  Allergies   Allergen Reactions    Pcn [Penicillins] Hives     childhood       SURGICAL HISTORY:  Past Surgical History:   Procedure Laterality Date    COLONOSCOPY N/A 10/31/2019    COLONOSCOPY performed by Yesenia Hernandez MD at [address] ENDOSCOPY N/A 10/29/2019    ESOPHAGOGASTRODUODENOSCOPY performed by Yesenia Hernandez MD at [address]:  Family history non-contributory  History reviewed. No pertinent family history.     SOCIAL HISTORY:  Social History     Tobacco History     Smoking Status  Current Every Day Smoker Smoking Frequency  0.75 packs/day    Smokeless Tobacco Use  Never Used          Alcohol History     Alcohol Use Status  Yes Comment  \"weekends watching football\"          Drug Use     Drug Use Status  Never          Sexual Activity     Sexually Active  Not Asked                HOME MEDICATIONS:  No current facility-administered medications on file prior to encounter. Current Outpatient Medications on File Prior to Encounter   Medication Sig Dispense Refill    omeprazole (PRILOSEC) 20 MG delayed release capsule Take 1 capsule by mouth 2 times daily 180 capsule 1    atorvastatin (LIPITOR) 80 MG tablet Take 1 tablet by mouth daily 30 tablet 3         ROS:   Constitutional- No weight loss or fevers   Eyes- No diplopia. No photophobia. Ears/nose/throat- No dysphagia. No Dysarthria   Cardiovascular- No palpitations. No chest pain   Respiratory- No dyspnea. No Cough   Gastrointestinal- No Abdominal pain. No Vomiting. Genitourinary- No incontinence. No urinary retention   Musculoskeletal- No myalgia. No arthralgia   Skin- No rash. No easy bruising. Psychiatric- No depression. No anxiety   Endocrine- No diabetes. No thyroid issues. Hematologic- No bleeding difficulty. No fatigue   Neurologic- No weakness. No Headache. PHYSICAL EXAM:  BP (!) 109/95   Pulse 64   Temp 98 °F (36.7 °C) (Oral)   Resp 18   SpO2 98%     General Alert, no distress, well-nourished  Cardiovascular: Warm, appears well perfused   Respiratory: Easy, non-labored respiratory pattern   Abdominal: Abdomen is without distention  Extremities: Upper and lower extremities are atraumatic in appearance without deformity. No swelling or erythema.      Neurologic  Mental status:   orientation to person, place, time, situation   Attention intact as able to attend well to the exam     Language fluent in conversation   Comprehension intact; follows simple commands    Cranial nerves:   CN2: Visual fields full w/o extinction on confrontational testing   CN 3,4,6: Pupils equal and reactive to light, extraocular muscles intact  CN5: Facial sensation symmetric   CN7: Face symmetric  CN8: Hearing symmetric to spoken voice  CN9: Palate elevated symmetrically  CN11: Traps full strength on shoulder shrug  CN12: Tongue midline with protrusion    Motor Exam:  Strength 5/5 in all 4 extremities    Sensory: light touch intact and symmetric in all 4 extremities. No sensory extinction on bilateral simultaneous stimulation  Cerebellar/coordination: finger nose finger normal without ataxia  Tone: normal in all 4 extremities  Gait: not tested  Frontal Release Signs:  negative      IMAGES:  Images personally reviewed and agree w/ radiology interpretation. Head CT w/o Contrast:  CT HEAD WO CONTRAST 12/28/2021    Narrative  CT HEAD WITHOUT CONTRAST    HISTORY: Altered mental status    COMPARISON STUDY: None. FINDINGS: Thin section axial images obtained through the head from skull base to vertex. Multiplanar reconstruction images received for interpretation. Low radiation dose CT technique utilized. Ventricles appear normal in size, shape, and configuration. No focal parenchymal density abnormality identified. There are no masses or midline shift. No abnormal extra-axial fluid collection seen. Brainstem and posterior fossa appear within normal  limits. Mild prominence of the right MCA bifurcation may reflect mild aneurysmal dilatation. Visualized orbits and paranasal sinuses are clear. No skull abnormality seen. Mucous retention cyst in the right maxillary sinus with partial opacification of the right ethmoid air cells. Remaining paranasal sinuses are clear. No skull abnormality  seen. Impression  1. No findings for acute intracranial abnormality. 2.  Mild prominence of the right MCA bifurcation. Small focal aneurysm is not excluded. Please CTA findings for additional information.        CTA of Head / Neck w/ Contrast:  CTA HEAD NECK W CONTRAST 12/28/2021    Narrative  PROCEDURE: CT ANGIOGRAPHY NECK WITH/WITHOUT CONTRAST    INDICATION: altered mental status    COMPARISON: none    TECHNIQUE: Limited noncontrast CT imaging performed for the purposes of IV contrast bolus tracking. Axial CT imaging obtained from skull base through the lung apices following administration of IV contrast. Axial images, multiplanar reformatted images,  and maximum intensity projection images were reviewed for CT angiographic technique. Up-to-date CT equipment and radiation dose reduction techniques were employed. IV contrast: 80 mL Isovue 370. FINDINGS:    AORTIC ARCH: Standard three-vessel aortic arch anatomy is present. INNOMINATE ARTERY: Normal.    RIGHT SUBCLAVIAN ARTERY: Normal.    RIGHT VERTEBRAL ARTERY: Normal.    RIGHT CAROTID ARTERY: There is contrast opacification of the right common carotid artery. Carotid bifurcation is widely patent. No flow significant stenosis or focal aneurysm. There is normal flow in the distal right ICA. LEFT SUBCLAVIAN ARTERY: Normal.    LEFT VERTEBRAL ARTERY: Normal.    LEFT CAROTID ARTERY: There is contrast opacification of left common carotid artery. Carotid bifurcation is widely patent. No focal aneurysm or flow significant stenosis. Normal flow in the distal left ICA. NECK SOFT TISSUES: No neck soft tissue mass or lymphadenopathy. VISUALIZED MEDIASTINUM: No mass or lymphadenopathy. VISUALIZED LUNG APICES: Paraseptal emphysematous changes most pronounced on the right. BONES: No destructive osseous process. OTHER: None. Impression  No acute CTA findings. No hemodynamically significant stenosis or focal aneurysm. PROCEDURE: CT ANGIOGRAPHY HEAD WITH/WITHOUT CONTRAST    INDICATION: altered mental status    COMPARISON: none    TECHNIQUE: Axial CT imaging obtained through the head prior to and following administration of IV contrast. Axial images, multiplanar reformatted images, and maximum intensity projection images were reviewed for CT angiographic technique. IV contrast: 80 mL Isovue 370.     FINDINGS:    ANTERIOR IntraVENous 2 times per day    pantoprazole  40 mg Oral BID AC    [START ON 12/29/2021] nicotine  1 patch TransDERmal Daily     sodium chloride      sodium chloride flush, 5-40 mL, PRN  sodium chloride, 25 mL, PRN  ondansetron, 4 mg, Q8H PRN   Or  ondansetron, 4 mg, Q6H PRN  polyethylene glycol, 17 g, Daily PRN  acetaminophen, 650 mg, Q6H PRN   Or  acetaminophen, 650 mg, Q6H PRN         IMPRESSION & RECOMMENDATIONS     IMPRESSION:  Transient global amnesia - pt's episode lasted less than 24 hrs, resolved at present and possibly will recur in the future  History of acute infarct involving hippocampus - do not suspect this is contributing to pt's episodes  History of unruptured right ICA aneurysm 5.5mm - increase in size from prior imaging (5.2mm)    RECOMMENDATIONS:  SLP cognitive and language evaluation  Recommend outpatient cognitive neuropsychiatric evaluation  Recommend outpatient neurosurgery follow up for right ICA aneurysm  Okay to discharge from neurology standpoint      Diya Salter MD   Neurology & Neurocritical Care   Neurology Line: [phone]  [employer]: [facility] NPs  12/28/2021 8:53 AM    I spent 45 minutes in the care of this patient. Over 50% of that time was in face-to-face counseling regarding disease process, diagnostic testing, preventative measures, and answering patient and family questions. Will discuss with Dr. Kem Pichardo.

## 2021-12-28 NOTE — PROGRESS NOTES
Discharge instructions given reguarding medications and follow up appts. Patient discharged to home.

## 2021-12-28 NOTE — PROGRESS NOTES
Physical Therapy/Occupational Therapy  No treatment/discharge  Chart reviewed for PT/OT evaluation. Spoke with patient who reports no mobility or self care concerns at this time, has been ambulating to bathroom without deficits. Patient encouraged to notify staff members if PT/OT needs arise during hospitalization; he verbalized understanding. Will sign off at this time. RN aware. Zhane [address]  Lady Tabares  [facility], OTR/L [id number]

## 2021-12-28 NOTE — H&P
History & Physical      CC amnesia    History Obtained From:  patient    HISTORY OF PRESENT ILLNESS:  78-year-old male with past medical history of CVA back in 2019(transient global amnesia), tobacco use presented to the ED with a chief complaint of amnesia. Per chart review, patient here with her stepson who complained patient has been forgetful and not remembering events of past week. Per patient, he knows he was having trouble with memory which is why he had to come to the hospital.  He confesses he doesn't remember what he did last week or on [place] Day. I asked the ER x3 questions and patient confessed he knows the answers only because someone pointed out to him earlier. In the ED, afebrile, RR 18, pulse 108, /115, SPO2 99% on RA. CT head without contrast nonrevealing. CTA head and neck with contrast revealing of unruptured right ICA aneurysm. Past Medical History:        Diagnosis Date    Hyperlipidemia     TIA (transient ischemic attack)    ·     Past Surgical History:        Procedure Laterality Date    COLONOSCOPY N/A 10/31/2019    COLONOSCOPY performed by Sly Giron MD at [address] [place] [address] ENDOSCOPY N/A 10/29/2019    ESOPHAGOGASTRODUODENOSCOPY performed by Sly Giron MD at [facility]   ·     Medications Priorto Admission:    · Not in a hospital admission. Allergies:  Pcn [penicillins]    Social History:   · TOBACCO:   reports that he has been smoking. He has been smoking about 0.75 packs per day. He has never used smokeless tobacco.  · ETOH:   reports current alcohol use. · DRUGS : None  · Patient currently lives with family at home  ·   Family History:   · History reviewed. No pertinent family history. Review of Systems    ROS: A 10 point review of systems was conducted, significant findings as noted in HPI. Physical Exam  HENT:      Head: Normocephalic and atraumatic.       Nose: Nose normal. Mouth/Throat:      Mouth: Mucous membranes are moist.      Pharynx: Oropharynx is clear. Eyes:      Extraocular Movements: Extraocular movements intact. Conjunctiva/sclera: Conjunctivae normal.      Pupils: Pupils are equal, round, and reactive to light. Cardiovascular:      Rate and Rhythm: Normal rate and regular rhythm. Pulses: Normal pulses. Heart sounds: Normal heart sounds. Pulmonary:      Effort: Pulmonary effort is normal.      Breath sounds: Normal breath sounds. Abdominal:      General: Bowel sounds are normal.      Palpations: Abdomen is soft. Musculoskeletal:         General: Normal range of motion. Cervical back: Normal range of motion and neck supple. Skin:     General: Skin is warm. Neurological:      Mental Status: He is alert. Cranial Nerves: No cranial nerve deficit. Sensory: No sensory deficit. Motor: No weakness. Coordination: Coordination normal.      Comments: Amnesia, unable to remember past weeks events. Physical exam:       Vitals:    12/28/21 0300   BP: (!) 109/95   Pulse: 64   Resp: 18   Temp:    SpO2: 98%       DATA:    Labs:  CBC:   Recent Labs     12/27/21 2038   WBC 9.4   HGB 16.6   HCT 49.1          BMP:   Recent Labs     12/27/21 2038      K 4.0      CO2 19*   BUN 14   CREATININE 1.0   GLUCOSE 98     LFT's: No results for input(s): AST, ALT, ALB, BILITOT, ALKPHOS in the last 72 hours. Troponin: No results for input(s): TROPONINI in the last 72 hours. BNP:No results for input(s): BNP in the last 72 hours. ABGs: No results for input(s): PHART, EYN6TZV, PO2ART in the last 72 hours. INR: No results for input(s): INR in the last 72 hours.     U/A:  Recent Labs     12/27/21  2357   COLORU Yellow   PHUR 6.0   CLARITYU Clear   SPECGRAV >=1.030   LEUKOCYTESUR Negative   UROBILINOGEN 0.2   BILIRUBINUR Negative   BLOODU Negative   GLUCOSEU Negative       CTA HEAD NECK W CONTRAST   Final Result      No acute CTA findings. No hemodynamically significant stenosis or focal aneurysm. PROCEDURE: CT ANGIOGRAPHY HEAD WITH/WITHOUT CONTRAST      INDICATION: altered mental status      COMPARISON: none      TECHNIQUE: Axial CT imaging obtained through the head prior to and following administration of IV contrast. Axial images, multiplanar reformatted images, and maximum intensity projection images were reviewed for CT angiographic technique. IV contrast: 80 mL Isovue 370. FINDINGS:      ANTERIOR CIRCULATION: There is focal aneurysm at the M2 bifurcation measuring 5.5 mm. Distal branches are normal in caliber. No filling defects or occlusion. No evidence for arteriovenous malformation. POSTERIOR CIRCULATION: The bilateral vertebral arteries, basilar artery and posterior cerebral arteries demonstrate no occlusion or stenosis. No evidence for aneurysm or arteriovenous malformation. INTRACRANIAL VENOUS SYSTEM: No evidence for intracranial venous thrombosis. 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 Remainder of the CT of the head otherwise unremarkable. CT HEAD WO CONTRAST   Final Result      1. No findings for acute intracranial abnormality. 2.  Mild prominence of the right MCA bifurcation. Small focal aneurysm is not excluded. Please CTA findings for additional information. ASSESSMENT AND PLAN:  52-year-old male with past medical history of CVA, HLD, tobacco use presents to the ED with his stepson with altered mental status. Patient was admitted the following concerns    Global Amnesia, -Patient has history of ischemic stroke: Left side single tiny focus of acute infarct involving hippocampus, presented with confusion and transient global amnesia on 9/1/2019. -CT head without contrast revealing of no acute intracranial abnormalities.   Mild prominence of right MCA bifurcation  -CTA head and neck with contrast revealing no hemodynamically significant stenosis. Unruptured right ICA aneurysm.  -Echo/TREY on 9/2/2019 reveals EF 60%. -During last admission patient had Linq loop recorder implanted on 9/4/2019, but patient did not follow-up with cardiology.  -Continue with ASA and statin  -Every 4 hours neurochecks  -Continuous telemetry monitoring  -Repeat echo  -Follow-up on lipid panel  -Neurology consulted, appreciate recs     Right ICA aneurysm, unruptured  -Increased in size from 5.2mm in September 2019 to 5.5 mm    Tobacco use  -1 pack/day since age 11  -Nicotine patch    Will discuss with attending physician. Code Status:Full code  FEN: Regular diet  PPX: Lovenox  DISPO: Chata Moon MD  12/28/2021,  4:04 AM    I have personally seen and evaluated this patient. I discussed findings and management with the resident, on 12/28/21  and agree as documented in this note. And changes made to the note. Admit as inpatient. I anticipate hospitalization spanning more than two midnights for investigation and treatment of the above medically necessary diagnoses.   CVA work up underway, counseled extensively on smoking cessation  Neurology been consulted, MRI without contrast for further evaluation  Echo with bubble study has been ordered  I have asked residents to sign off on this patient due to resident team caps    Misti Craig MD  Hospitalist  Attending Physician

## 2021-12-28 NOTE — DISCHARGE SUMMARY
Hospital Medicine Discharge Summary    Patient: Candance Due     Gender: male  : 1965   Age: 60 y.o. MRN: [id number]    Admitting Physician: No admitting provider for patient encounter. Discharge Physician: Poppy Johnson MD     Code Status: Full Code     Admit Date: 2021   Discharge Date: 2021      Disposition:  Home    Discharge Diagnoses: Active Hospital Problems    Diagnosis Date Noted    Transient global amnesia [G45.4] 2021    Smoking [F17.200] 2021    Hyperlipidemia [E78.5]     History of loop recorder [Z98.890]        Follow-up appointments:  one week    Outpatient to do list: F/U with PCP, Neuro and Cardio    Condition at Discharge:  [address]:   78-year-old male with past medical history of CVA back in 2019(transient global amnesia), tobacco use presented to the ED with a chief complaint of amnesia. Per chart review, patient here with her stepson who complained patient has been forgetful and not remembering events of past week. Admitted as inpatient for transient global amnesia.       Seen by Neuro and Cardio:    From Neuro: \"83LV man with prior episode of TGA in 2019 and an associated tiny lesion in left hippocampus (that was a result of the TGA, not a stroke causing TGA) presents with apparent recurrence of TGA  - TGA is short-lived (2-12 hours, 24 hours at most) but his memory issues span almost 3 days; however, he has retrograde amnesia to the events prior to yesterday but anterograde amnesia for about 24 hours, starting yesterday  - The anterograde amnesia is typical for TGA and this is the portion that lasted <24 hours  - The retrograde amnesia is unusual for TGA  - Needless to say, given his history of a prior event of this, unremarkable neuroimaging, and other consistencies with TGA, have no doubt that is what this is  - Of note, I do not feel that he EVER had a stroke, now or in the past (the lesion on his MRI from 2019 when he had the first episode of TGA is not uncharacteristic of TGA and does not represent infarction [ie, it is not stroke that causes TGA, it is TGA that can cause MRI signal change such as that)]  - As such, I do not feel that results of loop recorder are relevant to this event nor the one in 2019        TGA is characterized by an acute and sudden onset of anterograde amnesia and has no other neurologic symptoms or signs accompanying it. Patients are disoriented in time and often place but never to person. Specifics are forgotten even after being told multiple times, leading to repeated questioning (e.g. \"What am I doing here? \" \"What happened? \"). Patients will recognize their deficit and therefore keep asking, \"Why can't I remember? \" Usually the names of loved ones are preserved. Immediate memory is preserved (eg, able to repeat several digits or words). Procedural memory and the ability to perform complex tasks is preserved (eg, driving, arithmetic, or performing a previously learned skill such as a musician playing his/her instrument). There is sometimes increased anxiety or even depressed mood during the episode. Navigating familiar surroundings is usually intact but patients may get lost in unfamiliar areas. 10% may complain of a headache during the attack. Sometimes transient oculomotor abnormalities may be present.      The amnesia usually resolves within 2 to 12 hours in 66% of patients and in almost all, within 24 hours.      In many cases a precipitating factor, usually physical or psychological, can be identified. 33-84% of TGA is precipitated by either exercise, intense emotion, sexual intercourse, pain, temperature extremes (swimming in cold water or taking a hot bath), cervical manipulation, coughing spells, and medical procedures like cerebral or cardiac angiography. In this case, there was no clear precipitating event, but pt does not recall the events of yesterday leading up to this.  Close interview with family members present may be able to reveal a precipitating factor.     Diagnostic workup is usually negative and MRI of the brain with and without contrast is appropriate. EEG is recommended only in cases where there is an associated altered level of consciousness or impaired ability to perform complex tasks, which he did not exhibit.     I discussed with him and how the etiology of TGA is unknown and most theories have gone unproven, especially in regards to it representing a TIA, migraine, seizure, toxic or metabolic disturbance, or even a psychogenic entity. The recurrence is low, about 2.5% to 5% per year. I also discussed with him that he is free to return to his normal activities. Even if the episode was possibly precipitated by a specific event does not mean that he should refrain from it. There is no evidence that he has any increased risk of having a recurrence by returning to all of his normal activities, including any of the possible precipitating situations.      No further neuro workup necessary; OK for discharge from Neuro standpoint; will sign off; call with questions. '     Advised that he follow-up with Neurosurgery as outpt to re-evaluate his known right MCA aneurysm. \"    Cardio recommended:    #1 review the records to assess the history of this loop recorder. #2 interrogate the loop recorder  #3 recommend getting a fresh current EKG  #4 echocardiogram  No other plans or recommendations at this time. Started on ASA for ? TIA. Started on Wellbutrin XL and Hydroxyzine for anxiety/depression and insomnia. Patient will DC to home.   F/U with PCP, Neuro, NS and cardio    Discharge Medications:   Discharge Medication List as of 12/28/2021  1:56 PM      START taking these medications    Details   aspirin EC 81 MG EC tablet Take 1 tablet by mouth daily, Disp-30 tablet, R-0Normal      buPROPion (WELLBUTRIN XL) 300 MG extended release tablet Take 1 tablet by mouth every morning, Disp-30 tablet, R-0Normal      hydrOXYzine (ATARAX) 50 MG tablet Take 1 tablet by mouth nightly, Disp-30 tablet, R-0Normal           Discharge Medication List as of 12/28/2021  1:56 PM        Discharge Medication List as of 12/28/2021  1:56 PM      CONTINUE these medications which have NOT CHANGED    Details   omeprazole (PRILOSEC) 20 MG delayed release capsule Take 1 capsule by mouth 2 times daily, Disp-180 capsule, R-1Print      atorvastatin (LIPITOR) 80 MG tablet Take 1 tablet by mouth daily, Disp-30 tablet, R-3Normal           Discharge Medication List as of 12/28/2021  1:56 PM      STOP taking these medications       aspirin 325 MG tablet Comments:   Reason for Stopping:         dicyclomine (BENTYL) 10 MG capsule Comments:   Reason for Stopping:               Discharge Exam:    BP (!) 121/90   Pulse 77   Temp 97.8 °F (36.6 °C) (Oral)   Resp 18   SpO2 96%   General appearance:  NAD  HEENT:   Normal cephalic, atraumatic, moist mucous membranes, no oropharyngeal erythema or exudate  Neck: Supple, trachea midline, no anterior cervical or SC LAD  Heart[de-identified] Normal s1/s2, RRR, no murmurs, gallops, or rubs. No leg edema  Lungs:  No use of accessory musclesNormal respiratory effort. Clear to auscultation, bilaterally without Rales/Wheezes/Rhonchi. Abdomen: Soft, non-tender, non-distended, bowel sounds present, no masses  Musculoskeletal:  No clubbing, no cyanosis, no edema  Skin: No lesion or masses  Neurologic:  Neurovascularly intact without any focal sensory/motor deficits. Cranial nerves: II-XII intact, grossly non-focal.  Psychiatric:  A & O x3  Neuro: Grossly intact, moves all four extremities     Labs:  For convenience and continuity at follow-up the following most recent labs are provided:    Lab Results   Component Value Date    WBC 8.9 12/28/2021    HGB 16.1 12/28/2021    HCT 47.9 12/28/2021    MCV 90.6 12/28/2021     12/28/2021     12/28/2021    K 4.0 12/28/2021     12/28/2021    CO2 25 12/28/2021    BUN 13 12/28/2021    CREATININE 1.0 12/28/2021    CALCIUM 9.0 12/28/2021    ALKPHOS 113 05/26/2020    ALT 30 05/26/2020    AST 34 05/26/2020    BILITOT 0.4 05/26/2020    BILIDIR <0.2 04/24/2020    LABALBU 4.4 05/26/2020     No results found for: INR    Radiology:  ECHO Complete 2D W Doppler W Color    Result Date: 12/28/2021  Transthoracic Echocardiography Report (TTE)  Demographics   Patient Name       Aldair Borja   Date of Study      12/28/2021         Gender              Male   Patient Number     [id number]         Date of Birth       1965   Visit Number       [id number]          Age                 60 year(s)   Accession Number   [id number]         Room Number         B14   Corporate ID       [id number]           Alireza Johnson GORDON   Ordering Physician MD Yolanda Theodore MD         Physician  Procedure Type of Study   TTE procedure:ECHOCARDIOGRAM COMPLETE 2D W DOPPLER W COLOR. Procedure Date Date: 12/28/2021 Start: 09:43 AM Study Location: [address] Echo Lab Technical Quality: Adequate visualization Indications:Edema. Patient Status: Routine Height: 70 inches Weight: 167 pounds BSA: 1.93 m2 BMI: 23.96 kg/m2 BP: 109/95 mmHg  Conclusions   Summary  Normal left ventricle size, wall thickness, and systolic function with an  estimated ejection fraction of 55-60%. No regional wall motion abnormalities  Diastolic filling parameters suggests normal diastolic function. Signature   ------------------------------------------------------------------  Electronically signed by Puja Lincoln MD (Interpreting  physician) on 12/28/2021 at 01:04 PM  ------------------------------------------------------------------   Findings   Left Ventricle  Normal left ventricle size, wall thickness, and systolic function with an  estimated ejection fraction of 55-60%.  No regional wall motion abnormalities  are seen. Diastolic filling parameters suggests normal diastolic function. Mitral Valve  The mitral valve normal in structure and function. No evidence of mitral regurgitation or stenosis. Left Atrium  The left atrium is normal in size. Aortic Valve  The aortic valve appears tricuspid. The aortic valve is structurally normal. There is no significant aortic  valve regurgitation or stenosis. Aorta  The aortic root is normal in size. Right Ventricle  The right ventricle is normal in size and function. TAPSE 2.12 cm  RV S velocity 13.9 cm/s   Tricuspid Valve  The tricuspid valve is normal in structure and function. There is no  significant tricuspid valve regurgitation or stenosis. Right Atrium  The right atrial size is normal.   Pulmonic Valve  The pulmonic valve is not well visualized. There is no evidence of pulmonic valve regurgitation or stenosis. Pericardial Effusion  No pericardial effusion noted. Pleural Effusion  No pleural effusion. Miscellaneous  The inferior vena cava appears normal in size with normal respiratory  variation.   M-Mode/2D Measurements (cm)   LV Diastolic Dimension: 7.93 cm LV Systolic Dimension: 2.63 cm  LV Septum Diastolic: 1.1 cm     LV Septum Systolic: 4.01 cm  LV PW Diastolic: 2.91 cm        LV PW Systolic: 4.61 cm  RV Diastolic Dimension: 7.16 cm AO Root Dimension: 2.9 cm                                  LA Dimension: 3.6 cm                                  LA Area: 12.7 cm2                                  LA volume/Index: 27.9 ml /14 ml/m2  Doppler Measurements                                  MV Peak E-Wave: 65.6 cm/s                                 MV Peak A-Wave: 88.3 cm/s                                 MV E/A Ratio: 0.74   E' Septal Velocity: 7.07 cm/s  E' Lateral Velocity: 9.79 cm/s  E/E' ratio: 6.7                MV Deceleration Time: 211 msec  Aorta   Aortic Root: 2.9 cm      CT HEAD WO CONTRAST    Result Date: 12/28/2021  CT HEAD WITHOUT CONTRAST HISTORY: Altered mental status COMPARISON STUDY: None. FINDINGS: Thin section axial images obtained through the head from skull base to vertex. Multiplanar reconstruction images received for interpretation. Low radiation dose CT technique utilized. Ventricles appear normal in size, shape, and configuration. No focal parenchymal density abnormality identified. There are no masses or midline shift. No abnormal extra-axial fluid collection seen. Brainstem and posterior fossa appear within normal limits. Mild prominence of the right MCA bifurcation may reflect mild aneurysmal dilatation. Visualized orbits and paranasal sinuses are clear. No skull abnormality seen. Mucous retention cyst in the right maxillary sinus with partial opacification of the right ethmoid air cells. Remaining paranasal sinuses are clear. No skull abnormality seen. 1.  No findings for acute intracranial abnormality. 2.  Mild prominence of the right MCA bifurcation. Small focal aneurysm is not excluded. Please CTA findings for additional information. CTA HEAD NECK W CONTRAST    Result Date: 12/28/2021  PROCEDURE: CT ANGIOGRAPHY NECK WITH/WITHOUT CONTRAST INDICATION: altered mental status COMPARISON: none TECHNIQUE: Limited noncontrast CT imaging performed for the purposes of IV contrast bolus tracking. Axial CT imaging obtained from skull base through the lung apices following administration of IV contrast. Axial images, multiplanar reformatted images, and maximum intensity projection images were reviewed for CT angiographic technique. Up-to-date CT equipment and radiation dose reduction techniques were employed. IV contrast: 80 mL Isovue 370. FINDINGS: AORTIC ARCH: Standard three-vessel aortic arch anatomy is present.  INNOMINATE ARTERY: Normal. RIGHT SUBCLAVIAN ARTERY: Normal. RIGHT VERTEBRAL ARTERY: Normal. RIGHT CAROTID ARTERY: There is contrast opacification of the right common carotid artery. Carotid bifurcation is widely patent. No flow significant stenosis or focal aneurysm. There is normal flow in the distal right ICA. LEFT SUBCLAVIAN ARTERY: Normal. LEFT VERTEBRAL ARTERY: Normal. LEFT CAROTID ARTERY: There is contrast opacification of left common carotid artery. Carotid bifurcation is widely patent. No focal aneurysm or flow significant stenosis. Normal flow in the distal left ICA. NECK SOFT TISSUES: No neck soft tissue mass or lymphadenopathy. VISUALIZED MEDIASTINUM: No mass or lymphadenopathy. VISUALIZED LUNG APICES: Paraseptal emphysematous changes most pronounced on the right. BONES: No destructive osseous process. OTHER: None. No acute CTA findings. No hemodynamically significant stenosis or focal aneurysm. PROCEDURE: CT ANGIOGRAPHY HEAD WITH/WITHOUT CONTRAST INDICATION: altered mental status COMPARISON: none TECHNIQUE: Axial CT imaging obtained through the head prior to and following administration of IV contrast. Axial images, multiplanar reformatted images, and maximum intensity projection images were reviewed for CT angiographic technique. IV contrast: 80 mL Isovue 370. FINDINGS: ANTERIOR CIRCULATION: There is focal aneurysm at the M2 bifurcation measuring 5.5 mm. Distal branches are normal in caliber. No filling defects or occlusion. No evidence for arteriovenous malformation. POSTERIOR CIRCULATION: The bilateral vertebral arteries, basilar artery and posterior cerebral arteries demonstrate no occlusion or stenosis. No evidence for aneurysm or arteriovenous malformation. INTRACRANIAL VENOUS SYSTEM: No evidence for intracranial venous thrombosis. IMPRESSION: 5.5 mm right middle cerebral artery aneurysm at the M2 bifurcation. Remainder of the CT of the head otherwise unremarkable. MRI BRAIN WO CONTRAST    Result Date: 12/28/2021  MRI head without contrast HISTORY: Global amnesia. Recurrent transient ischemic attacks.  Comments: MRI without contrast is compared to head CT of 12/20/2021. There is mild periventricular microangiopathy. Chronic ischemic changes in the brainstem. No restricted diffusion. No intracranial hemorrhage or hydrocephalus. Right ethmoid and maxillary retention cysts are noted. Mastoid air cells are clear. Arterial and venous flow voids are well maintained. No acute infarct. The patient was seen and examined on day of discharge and this discharge summary is in conjunction with any daily progress note from day of discharge. Time Spent on discharge is 45 minutes  in the examination, evaluation, counseling and review of medications and discharge plan. Note that more than 30 minutes was spent in preparing discharge papers, discussing discharge with patient, medication review, etc.       Signed:    Tiffanie Tan MD   12/28/2021      Thank you Yojana Tavares DO for the opportunity to be involved in this patient's care.  If you have any questions or concerns please feel free to contact me at [facility], [facility]

## 2021-12-28 NOTE — PROGRESS NOTES
To whom this may concern:    Mr Yan Reyes was admitted at [facility] b/w 12/27 to 12/28/21. He can return to work without restrictions on 12/29/21. Thanks for your understanding. Call with questions.           Sincerely,        Frances Donaldson MD    [phone]

## 2021-12-28 NOTE — CONSULTS
[facility]    Cardiology Consult/H&P  Consulting Cardiologist Kaya Hinkle MD , [facility] [place]  Referring Provider:  Denise Contreras DO    12/28/2021, 8:42 AM    Chief Complaint   Patient presents with    Altered Mental Status      Primary Cardiologist:  Asked by No admitting provider for patient encounter./Pooja Donaldson DO to evaluate and assess this patient's evaluate patient's loop recorder status     History of Present Illness:   Wilfrid Mcmahon is a 60 y.o. male admitted with mental status change. Does have a history of CVA/TIA in 2019 manifest by transient global amnesia. Apparently no motor deficits at the time. Admitted currently with recurrence of his global amnesia. Denies any chest pains or shortness of breath. Does not have a history of small cervical middle cerebral aneurysm that did not require intervention. There is a history of a loop recorder implant that occurred while living in [place]. He has subsequently moved to the [facility]. The implant was he thinks about 3 years ago and the battery has probably depleted by now. .  I still can not access the care everywhere. He is found to be awake and alert. He is having no distress. Seems to be an agreeable person and at this time his memory and all functions seem to be intact. 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 Judyth Found has not been performed yet. EKG has not been performed yet. Past Medical History:   has a past medical history of Hyperlipidemia and TIA (transient ischemic attack). Surgical History:   has a past surgical history that includes Insertable Cardiac Monitor; Upper gastrointestinal endoscopy (N/A, 10/29/2019); and Colonoscopy (N/A, 10/31/2019). Social History:   reports that he has been smoking. He has been smoking about 0.75 packs per day.  He has never used smokeless tobacco. He reports current alcohol use. He reports that he does not use drugs. Family History:  family history is not on file. Home Medications:  Prior to Admission medications    Medication Sig Start Date End Date Taking?  Authorizing Provider   omeprazole (PRILOSEC) 20 MG delayed release capsule Take 1 capsule by mouth 2 times daily 2/1/21   Eric Miller DO   atorvastatin (LIPITOR) 80 MG tablet Take 1 tablet by mouth daily 12/9/20   Minnie Potts MD        Current Medications:  Current Facility-Administered Medications   Medication Dose Route Frequency Provider Last Rate Last Admin    nicotine (NICODERM CQ) 21 MG/24HR 1 patch  1 patch TransDERmal Once Jersey Ricks PA-C   1 patch at 12/28/21 0241    atorvastatin (LIPITOR) tablet 80 mg  80 mg Oral Daily Rocio Danielson MD        sodium chloride flush 0.9 % injection 5-40 mL  5-40 mL IntraVENous 2 times per day Rocio Danielson MD        sodium chloride flush 0.9 % injection 5-40 mL  5-40 mL IntraVENous PRN Rocio Danielson MD        0.9 % sodium chloride infusion  25 mL IntraVENous PRN Rocio Danielson MD        ondansetron (ZOFRAN-ODT) disintegrating tablet 4 mg  4 mg Oral Q8H PRN Rocio Danielson MD        Or    ondansetron (ZOFRAN) injection 4 mg  4 mg IntraVENous Q6H PRN Rocio Danielson MD        polyethylene glycol (GLYCOLAX) packet 17 g  17 g Oral Daily PRN Rocio Danielson MD        acetaminophen (TYLENOL) tablet 650 mg  650 mg Oral Q6H PRN Rocio Danielson MD        Or    acetaminophen (TYLENOL) suppository 650 mg  650 mg Rectal Q6H PRN Rocio Danielson MD        pantoprazole (PROTONIX) tablet 40 mg  40 mg Oral BID AC Rocio Danielson MD        [START ON 12/29/2021] nicotine (NICODERM CQ) 21 MG/24HR 1 patch  1 patch TransDERmal Daily Rocio Danielson MD         Current Outpatient Medications   Medication Sig Dispense Refill    omeprazole (PRILOSEC) 20 MG delayed release capsule Take 1 capsule by mouth 2 times daily 180 capsule 1    atorvastatin (LIPITOR) 80 MG tablet Take 1 tablet by mouth daily 30 tablet 3       Allergies:  Pcn [penicillins]     Review of Systems:   · Constitutional: there has been no unanticipated weight loss. · Eyes: No visual changes or diplopia. No scleral icterus. · ENT: No Headaches, hearing loss or vertigo. No mouth sores or sore throat. · Cardiovascular: Reviewed in HPI  ·  Pulmonary:  no cough or sputum production. · Gastrointestinal: No abdominal pain, appetite loss, blood in stools. No change in bowel or bladder habits. · Genitourinary: No dysuria, trouble voiding, or hematuria. · Musculoskeletal:  No gait disturbance, weakness or joint complaints. · Integumentary: No rash or pruritis. Endocrine: No malaise, fatigue or temperature intolerance. Hematologic/Lymphatic: No abnormal bruising or bleeding, blood clots or swollen lymph nodes. · Allergic/Immunologic: No nasal congestion or hives. · All other ROS are reviewed and are unremarkable. Physical Examination:    Vitals:    12/27/21 1910 12/28/21 0000 12/28/21 0300   BP: (!) 150/115 (!) 128/90 (!) 109/95   Pulse: 108 72 64   Resp: 18 18 18   Temp: 98 °F (36.7 °C)     TempSrc: Oral     SpO2: 99% 99% 98%        EXAMl and General Appearance: Physically healthy and somewhat confused. HEENT: eyes and ears intact. No nasal masses  THYROID: not enlarged  LUNGS:  · Clear to auscultation and percussion  HEART and VASCULAR:  · The apical impulses not displaced  · Heart tones are crisp and normal  · Cervical veins are not engorged  · The carotid upstroke is normal in amplitude and contour without delay or bruit  · Peripheral pulses are symmetrical and full  · There is no clubbing, cyanosis of the extremities. · Moderate bilateral peripheral edema. Marion Merrill absent right great toe  · Femoral Arteries: 2+ and equal  · Pedal Pulses:2+ and equal   ABDOMEN[de-identified]  · No masses or tenderness  · Liver/Spleen: No Abnormalities Noted  NEUROLOGICAL:  . Moves all extremities to command.  Cranial nerves 2-12 are in tact. PSYCHIATRIC: alert and lucid  and oriented and appropriate  SKIN: No lesions or rashes  LYMPH NODES: none enlarged    ·   ·   ·     CBC with Differential:    Lab Results   Component Value Date    WBC 9.4 12/27/2021    RBC 5.46 12/27/2021    HGB 16.6 12/27/2021    HCT 49.1 12/27/2021     12/27/2021    MCV 90.0 12/27/2021    MCH 30.4 12/27/2021    MCHC 33.8 12/27/2021    RDW 14.9 12/27/2021    LYMPHOPCT 18.7 12/27/2021    MONOPCT 4.4 12/27/2021    BASOPCT 0.8 12/27/2021    MONOSABS 0.4 12/27/2021    LYMPHSABS 1.8 12/27/2021    EOSABS 0.0 12/27/2021    BASOSABS 0.1 12/27/2021     BMP:    Lab Results   Component Value Date     12/27/2021    K 4.0 12/27/2021     12/27/2021    CO2 19 12/27/2021    BUN 14 12/27/2021    LABALBU 4.4 05/26/2020    CREATININE 1.0 12/27/2021    CALCIUM 9.0 12/27/2021    GFRAA >60 12/27/2021    LABGLOM >60 12/27/2021     Uric Acid:  No components found for: URIC  PT/INR:  No results found for: PROTIME, INR  Last 3 Troponin:    Lab Results   Component Value Date    TROPONINI <0.01 04/24/2020    TROPONINI 0.01 04/24/2020    TROPONINI <0.01 10/21/2019     FLP:  No results found for: TRIG, HDL, LDLCALC, LDLDIRECT, LABVLDL  Impression   CT of the brain 12/28/2021   1.  No findings for acute intracranial abnormality.       2.  Mild prominence of the right MCA bifurcation.  Small focal aneurysm is not excluded.  Please CTA findings for additional information.         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  Remainder of the CT of the head otherwise unremarkable. EKG: On 4/24/2020 sinus rhythm 67/min. Essentially normal study. echocardiogram pending  Assessment/ Plan     Patient Active Problem List    Diagnosis Date Noted    Altered mental status 12/28/2021    Esophagitis 01/15/2021    GERD with esophagitis 01/15/2021   Patient admitted with altered mental status.   Apparently has a small aneurysm and is being evaluated by neurosurgery. No apparent cardiac symptomatology. Reportedly has a loop recorder in position. The chest x-ray is not done and I cannot corroborate. Plan  #1 review the records to assess the history of this loop recorder. #2 interrogate the loop recorder  #3 recommend getting a fresh current EKG  #4 echocardiogram  No other plans or recommendations at this time. Thanks for allowing me the opportunity  to participate in the evaluation and care of your patients.  Please call if we can assist further [phone]    This chart was likely completed using voice recognition technology and may contain unintended grammatical , phraseology,and/or punctuation errors  Zee Navarro MD , [facility] [place]  12/28/20218:42 AM

## 2021-12-28 NOTE — FLOWSHEET NOTE
12/28/21 1055   Encounter Summary   Services provided to: Patient   Referral/Consult From: Rounding   Continue Visiting   (12/28/21, MARIAJOSE. )   Complexity of Encounter Moderate   Length of Encounter 15 minutes   Routine   Type Initial   Assessment Calm; Approachable   Intervention Nurtured hope   Outcome Expressed gratitude

## 2021-12-28 NOTE — ED PROVIDER NOTES
ED Attending Attestation Note     Date of evaluation: 12/27/2021    This patient was seen by the advance practice provider. I have seen and examined the patient, agree with the workup, evaluation, management and diagnosis. The care plan has been discussed. My assessment reveals current NIH stroke scale of 0.        Mckay Stokes MD  12/28/21 0002

## 2021-12-28 NOTE — PROGRESS NOTES
This Rn taking over care of pt. PT is A/O x4 and denies pain. Pt has a steady gait and ambulated to the BR. VSS. Bed alarm on, wheels locked, bed in lowest position, side rails up 2/4, nonskid socks on, call light and bedside table in reach. Will continue to monitor.

## 2021-12-28 NOTE — ED PROVIDER NOTES
1 [facility]  EMERGENCY DEPARTMENT ENCOUNTER          PHYSICIAN ASSISTANT NOTE       Date of evaluation: 12/27/2021    Chief Complaint     Altered Mental Status      History of Present Illness     Wilfrid Mcmahon is a 60 y.o. male with PMH of CVA, HLD, Tobacco use who presents with his stepson from home with altered mental status. Per stepson, patient was last known normal yesterday. Today, patient has been repeatedly asking the same questions and has no recollection of the events of the last few days. His stepson also reports that patient has looked for his insurance card in his wallet approximately 60 times since they arrived here in the emergency department. Maranda Jhaveri reports patient has a prior history of CVA with similar symptoms approximately 2 years ago when he was living in [place]. Patient himself has no complaints today. He denies any headache, vision changes, dizziness, speech problems, chest pain or shortness of breath, nausea or vomiting, focal weakness or numbness, fever, cough, change in bowel or urinary habits. He does not have a primary care provider. Review of Systems     Review of Systems   Constitutional: Negative for chills and fever. HENT: Negative for congestion. Eyes: Negative for visual disturbance. Respiratory: Negative for shortness of breath. Cardiovascular: Negative for chest pain. Gastrointestinal: Negative for abdominal pain, nausea and vomiting. Genitourinary: Negative for difficulty urinating. Musculoskeletal: Negative for gait problem. Skin: Negative for wound. Neurological: Negative for dizziness and headaches. Psychiatric/Behavioral: Positive for confusion. The patient is not nervous/anxious. Past Medical, Surgical, Family, and Social History     He has a past medical history of Hyperlipidemia and TIA (transient ischemic attack). He has a past surgical history that includes Insertable Cardiac Monitor;  Upper gastrointestinal endoscopy (N/A, 10/29/2019); and Colonoscopy (N/A, 10/31/2019). His family history is not on file. He reports that he has been smoking. He has been smoking about 0.75 packs per day. He has never used smokeless tobacco. He reports current alcohol use. He reports that he does not use drugs. Medications     Previous Medications    ATORVASTATIN (LIPITOR) 80 MG TABLET    Take 1 tablet by mouth daily    OMEPRAZOLE (PRILOSEC) 20 MG DELAYED RELEASE CAPSULE    Take 1 capsule by mouth 2 times daily       Allergies     He is allergic to pcn [penicillins]. Physical Exam     INITIAL VITALS: BP: (!) 150/115,Temp: 98 °F (36.7 °C), Pulse: 108, Resp: 18, SpO2: 99 %   Physical Exam  Vitals and nursing note reviewed. Constitutional:       General: He is not in acute distress. HENT:      Head: Normocephalic and atraumatic. Eyes:      Extraocular Movements: Extraocular movements intact. Conjunctiva/sclera: Conjunctivae normal.   Cardiovascular:      Rate and Rhythm: Normal rate and regular rhythm. Pulmonary:      Effort: Pulmonary effort is normal. No respiratory distress. Breath sounds: Normal breath sounds. No wheezing, rhonchi or rales. Abdominal:      General: Bowel sounds are normal. There is no distension. Palpations: Abdomen is soft. Tenderness: There is no abdominal tenderness. There is no guarding or rebound. Musculoskeletal:         General: No deformity. Cervical back: Neck supple. Skin:     General: Skin is warm and dry. Neurological:      General: No focal deficit present. Mental Status: He is alert and oriented to person, place, and time. Cranial Nerves: Cranial nerves are intact. No cranial nerve deficit or dysarthria. Sensory: No sensory deficit. Motor: No weakness or pronator drift. Coordination: Finger-Nose-Finger Test normal.      Gait: Gait normal.      Comments: No aphasia.    Psychiatric:         Mood and Affect: Mood normal.         Behavior: Behavior normal.         Diagnostic Results     RADIOLOGY:  CTA HEAD NECK W CONTRAST   Final Result      No acute CTA findings. No hemodynamically significant stenosis or focal aneurysm. PROCEDURE: CT ANGIOGRAPHY HEAD WITH/WITHOUT CONTRAST      INDICATION: altered mental status      COMPARISON: none      TECHNIQUE: Axial CT imaging obtained through the head prior to and following administration of IV contrast. Axial images, multiplanar reformatted images, and maximum intensity projection images were reviewed for CT angiographic technique. IV contrast: 80 mL Isovue 370. FINDINGS:      ANTERIOR CIRCULATION: There is focal aneurysm at the M2 bifurcation measuring 5.5 mm. Distal branches are normal in caliber. No filling defects or occlusion. No evidence for arteriovenous malformation. POSTERIOR CIRCULATION: The bilateral vertebral arteries, basilar artery and posterior cerebral arteries demonstrate no occlusion or stenosis. No evidence for aneurysm or arteriovenous malformation. INTRACRANIAL VENOUS SYSTEM: No evidence for intracranial venous thrombosis. 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 Remainder of the CT of the head otherwise unremarkable. CT HEAD WO CONTRAST   Final Result      1. No findings for acute intracranial abnormality. 2.  Mild prominence of the right MCA bifurcation. Small focal aneurysm is not excluded. Please CTA findings for additional information.           LABS:   Results for orders placed or performed during the hospital encounter of 94/20/89   Basic Metabolic Panel w/ Reflex to MG   Result Value Ref Range    Sodium 140 136 - 145 mmol/L    Potassium reflex Magnesium 4.0 3.5 - 5.1 mmol/L    Chloride 106 99 - 110 mmol/L    CO2 19 (L) 21 - 32 mmol/L    Anion Gap 15 3 - 16    Glucose 98 70 - 99 mg/dL    BUN 14 7 - 20 mg/dL    CREATININE 1.0 0.9 - 1.3 mg/dL    GFR Non-African American >60 >60 GFR African American >60 >60    Calcium 9.0 8.3 - 10.6 mg/dL   CBC auto differential   Result Value Ref Range    WBC 9.4 4.0 - 11.0 K/uL    RBC 5.46 4.20 - 5.90 M/uL    Hemoglobin 16.6 13.5 - 17.5 g/dL    Hematocrit 49.1 40.5 - 52.5 %    MCV 90.0 80.0 - 100.0 fL    MCH 30.4 26.0 - 34.0 pg    MCHC 33.8 31.0 - 36.0 g/dL    RDW 14.9 12.4 - 15.4 %    Platelets 841 872 - 358 K/uL    MPV 7.9 5.0 - 10.5 fL    Neutrophils % 75.7 %    Lymphocytes % 18.7 %    Monocytes % 4.4 %    Eosinophils % 0.4 %    Basophils % 0.8 %    Neutrophils Absolute 7.1 1.7 - 7.7 K/uL    Lymphocytes Absolute 1.8 1.0 - 5.1 K/uL    Monocytes Absolute 0.4 0.0 - 1.3 K/uL    Eosinophils Absolute 0.0 0.0 - 0.6 K/uL    Basophils Absolute 0.1 0.0 - 0.2 K/uL   Urinalysis, reflex to microscopic   Result Value Ref Range    Color, UA Yellow Straw/Yellow    Clarity, UA Clear Clear    Glucose, Ur Negative Negative mg/dL    Bilirubin Urine Negative Negative    Ketones, Urine Negative Negative mg/dL    Specific Gravity, UA >=1.030 1.005 - 1.030    Blood, Urine Negative Negative    pH, UA 6.0 5.0 - 8.0    Protein, UA Negative Negative mg/dL    Urobilinogen, Urine 0.2 <2.0 E.U./dL    Nitrite, Urine Negative Negative    Leukocyte Esterase, Urine Negative Negative    Microscopic Examination Not Indicated     Urine Type Voided    POCT Glucose   Result Value Ref Range    POC Glucose 87 70 - 99 mg/dl    Performed on ACCU-CHEK        RECENT VITALS:  BP: (!) 128/90, Temp: 98 °F (36.7 °C), Pulse: 72,Resp: 18, SpO2: 99 %     Procedures         ED Course     Nursing Notes, Past Medical Hx, Past Surgical Hx, Social Hx, Allergies, and Family Hx were reviewed.     The patient was given the followingmedications:  Orders Placed This Encounter   Medications    iopamidol (ISOVUE-370) 76 % injection 80 mL    nicotine (NICODERM CQ) 21 MG/24HR 1 patch       CONSULTS:  Giacomo Gutierrez / ASSESSMENT / Lizzeth Cayden is a 60 y.o. male with PMH of CVA, HLD, Tobacco use who presents with his lena from home with altered mental status. Per lena, patient has been confused today with no recollection of the last couple of days, he has been repeating the same questions over and over, and lena reports that he has been looking for his insurance card in his wallet multiple times since arrival in the emergency department. Patient reportedly has a history of prior CVA with similar symptoms. On exam, he is hypertensive and mildly tachycardic with otherwise normal vital signs. He is alert and oriented x3. No focal neurologic deficit noted. Heart rhythm regular. Lungs clear to auscultation. Abdomen soft and nontender. Labs obtained unremarkable. UA negative. 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 At this point in time, patient will require admission to the hospital for MRI and further management of their clinical condition. I spoke with the admitting team who is in agreement with plan for admission. Patient and family are in agreement with plan for admission. Patient will be cared for in the ED prior to a bed becoming available upstairs     This patient was also evaluated by the attending physician. All care plans were discussed and agreed upon. Clinical Impression     1.  Amnesia, global, transient        Disposition     DISPOSITION Decision To Admit 12/28/2021 01:25:34 AM       Sara Becerra PA-C  12/28/21 0139

## 2021-12-28 NOTE — ED TRIAGE NOTES
Pt and son state pt has \"been confused and forgetful\" since this am. Pt is A&O x4 upon questioning.

## 2021-12-28 NOTE — ACP (ADVANCE CARE PLANNING)
Advanced Care Planning Note. Purpose of Encounter: Advanced care planning in light of transient global amnesia  Parties In Attendance: Patient,  Decisional Capacity: Yes  Subjective: Patient denies CP, SOB, HA  Objective: Cr 1..0  Goals of Care Determination: Patient wants full support (CPR, vent, surgery, HD, trach, PEG)  Plan:  Neuro and Cardio consult  Code Status: Full code   Time spent on Advanced care Plannin minutes  Advanced Care Planning Documents: Completed advanced directives on chart, significant other is the POA.     Yousuf Chaudhary MD  2021 12:24 PM

## 2025-01-09 ENCOUNTER — HOSPITAL ENCOUNTER (EMERGENCY)
Age: 60
Discharge: HOME | End: 2025-01-09
Attending: STUDENT IN AN ORGANIZED HEALTH CARE EDUCATION/TRAINING PROGRAM
Payer: COMMERCIAL

## 2025-01-09 VITALS
OXYGEN SATURATION: 96 % | BODY MASS INDEX: 26.77 KG/M2 | DIASTOLIC BLOOD PRESSURE: 83 MMHG | TEMPERATURE: 98.2 F | WEIGHT: 187 LBS | SYSTOLIC BLOOD PRESSURE: 121 MMHG | RESPIRATION RATE: 18 BRPM | HEART RATE: 95 BPM | HEIGHT: 70 IN

## 2025-01-09 DIAGNOSIS — R07.81 RIB PAIN: Primary | ICD-10-CM

## 2025-01-09 PROCEDURE — 99282 EMERGENCY DEPT VISIT SF MDM: CPT

## 2025-01-09 NOTE — DISCHARGE INSTRUCTIONS
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  At this time your symptoms have significantly improved and you are having no limitations to range of motion or activity levels.  You are appropriate to return to work at this time.    Please follow-up with your primary care provider or return to the emergency department with any return/worsening of symptoms.  Return to the emergency room with any chest pain, shortness of breath, loss conscious, or symptoms that are concerning to you
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
